# Patient Record
Sex: FEMALE | Race: WHITE
[De-identification: names, ages, dates, MRNs, and addresses within clinical notes are randomized per-mention and may not be internally consistent; named-entity substitution may affect disease eponyms.]

---

## 2017-02-10 ENCOUNTER — OFFICE VISIT (OUTPATIENT)
Dept: FAMILY MEDICINE CLINIC | Facility: CLINIC | Age: 27
End: 2017-02-10

## 2017-02-10 VITALS — DIASTOLIC BLOOD PRESSURE: 84 MMHG | TEMPERATURE: 98 F | HEART RATE: 89 BPM | SYSTOLIC BLOOD PRESSURE: 110 MMHG

## 2017-02-10 DIAGNOSIS — S67.22XA: ICD-10-CM

## 2017-02-10 PROBLEM — S67.10XA: Status: ACTIVE | Noted: 2017-02-10

## 2017-02-10 PROCEDURE — 11730 AVULSION NAIL PLATE SIMPLE 1: CPT | Performed by: PHYSICIAN ASSISTANT

## 2017-02-10 PROCEDURE — 99213 OFFICE O/P EST LOW 20 MIN: CPT | Performed by: PHYSICIAN ASSISTANT

## 2017-02-10 PROCEDURE — 90714 TD VACC NO PRESV 7 YRS+ IM: CPT | Performed by: PHYSICIAN ASSISTANT

## 2017-02-10 PROCEDURE — 90471 IMMUNIZATION ADMIN: CPT | Performed by: PHYSICIAN ASSISTANT

## 2017-02-10 PROCEDURE — 96372 THER/PROPH/DIAG INJ SC/IM: CPT | Performed by: PHYSICIAN ASSISTANT

## 2017-02-10 RX ORDER — CEFTRIAXONE 500 MG/1
1000 INJECTION, POWDER, FOR SOLUTION INTRAMUSCULAR; INTRAVENOUS ONCE
Status: COMPLETED | OUTPATIENT
Start: 2017-02-10 | End: 2017-02-10

## 2017-02-10 RX ORDER — CLINDAMYCIN HYDROCHLORIDE 300 MG/1
300 CAPSULE ORAL 3 TIMES DAILY
COMMUNITY

## 2017-02-10 RX ADMIN — CEFTRIAXONE 1000 MG: 500 INJECTION, POWDER, FOR SOLUTION INTRAMUSCULAR; INTRAVENOUS at 17:57

## 2022-01-06 ENCOUNTER — OFFICE VISIT (OUTPATIENT)
Dept: PRIMARY CARE CLINIC | Age: 32
End: 2022-01-06

## 2022-01-06 ENCOUNTER — HOSPITAL ENCOUNTER (OUTPATIENT)
Age: 32
Setting detail: SPECIMEN
Discharge: HOME | End: 2022-01-06

## 2022-01-06 VITALS
WEIGHT: 200 LBS | TEMPERATURE: 97.3 F | HEART RATE: 81 BPM | OXYGEN SATURATION: 98 % | HEIGHT: 69 IN | SYSTOLIC BLOOD PRESSURE: 117 MMHG | BODY MASS INDEX: 29.62 KG/M2 | DIASTOLIC BLOOD PRESSURE: 68 MMHG

## 2022-01-06 DIAGNOSIS — Z20.822 CONTACT WITH AND (SUSPECTED) EXPOSURE TO COVID-19: Primary | ICD-10-CM

## 2022-01-06 DIAGNOSIS — Z20.822 CONTACT WITH AND (SUSPECTED) EXPOSURE TO COVID-19: ICD-10-CM

## 2022-01-06 PROCEDURE — 99203 OFFICE O/P NEW LOW 30 MIN: CPT

## 2022-01-06 RX ORDER — VENLAFAXINE HYDROCHLORIDE 75 MG/1
CAPSULE, EXTENDED RELEASE ORAL
COMMUNITY
Start: 2021-12-02

## 2022-01-06 ASSESSMENT — ENCOUNTER SYMPTOMS
CHEST TIGHTNESS: 0
SINUS COMPLAINT: 1
GASTROINTESTINAL NEGATIVE: 1
ABDOMINAL PAIN: 0
EYE DISCHARGE: 0
WHEEZING: 0
RHINORRHEA: 0
SINUS PAIN: 0
COUGH: 1
SORE THROAT: 1
NAUSEA: 0
EYES NEGATIVE: 1
EYE ITCHING: 0
HOARSE VOICE: 0
SWOLLEN GLANDS: 0
EYE PAIN: 0
SINUS PRESSURE: 0
DIARRHEA: 0
VOMITING: 0
SHORTNESS OF BREATH: 0

## 2022-01-06 ASSESSMENT — PATIENT HEALTH QUESTIONNAIRE - PHQ9
SUM OF ALL RESPONSES TO PHQ QUESTIONS 1-9: 1
2. FEELING DOWN, DEPRESSED OR HOPELESS: 1
1. LITTLE INTEREST OR PLEASURE IN DOING THINGS: 0
SUM OF ALL RESPONSES TO PHQ9 QUESTIONS 1 & 2: 1
SUM OF ALL RESPONSES TO PHQ QUESTIONS 1-9: 1

## 2022-01-06 NOTE — PROGRESS NOTES
[facility] [address] [place] [facility] [address]    [facility] WALK IN CARE  [id number] [address]  Dept: [phone]    Luz Mckenzie is a 28 y.o. female New patient, who presents to the walk-in clinic today with conditions/complaints as noted below:    Chief Complaint   Patient presents with    Concern For COVID-19     cough, congestion, fever; known exposure to COVID          HPI:     Sinus Problem  This is a new problem. Episode onset: Few days ago. The problem has been gradually worsening since onset. There has been no fever. The pain is mild. Associated symptoms include congestion, coughing and a sore throat. Pertinent negatives include no chills, diaphoresis, ear pain, headaches, hoarse voice, neck pain, shortness of breath, sinus pressure, sneezing or swollen glands. Past treatments include nothing. Declines influenza swabbing. History reviewed. No pertinent past medical history. Current Outpatient Medications   Medication Sig Dispense Refill    venlafaxine (EFFEXOR XR) 75 MG extended release capsule       clindamycin (CLEOCIN) 300 MG capsule Take 300 mg by mouth 3 times daily (Patient not taking: Reported on 1/6/2022)      HYDROcodone-acetaminophen (NORCO) 5-325 MG per tablet Take 1-2 tablets by mouth every 6 hours as needed for Pain . (Patient not taking: Reported on 1/6/2022) 30 tablet 0     No current facility-administered medications for this visit. No Known Allergies    Review of Systems:     Review of Systems   Constitutional: Positive for fever. Negative for chills, diaphoresis and fatigue. HENT: Positive for congestion and sore throat. Negative for ear pain, hoarse voice, postnasal drip, rhinorrhea, sinus pressure, sinus pain and sneezing. Eyes: Negative. Negative for pain, discharge and itching. Respiratory: Positive for cough. Negative for chest tightness, shortness of breath and wheezing. Cardiovascular: Negative. Negative for chest pain, palpitations and leg swelling. Gastrointestinal: Negative. Negative for abdominal pain, diarrhea, nausea and vomiting. Musculoskeletal: Negative for neck pain. Skin: Negative. Negative for rash. Neurological: Negative. Negative for dizziness, weakness, numbness and headaches. Physical Exam:      /68   Pulse 81   Temp 97.3 °F (36.3 °C) (Infrared)   Ht 5' 9\" (1.753 m)   Wt 200 lb (90.7 kg)   SpO2 98%   BMI 29.53 kg/m²     Physical Exam  Vitals reviewed. Constitutional:       Appearance: Normal appearance. She is normal weight. HENT:      Head: Normocephalic and atraumatic. Right Ear: Tympanic membrane, ear canal and external ear normal.      Left Ear: Tympanic membrane, ear canal and external ear normal.      Nose: Congestion and rhinorrhea present. Mouth/Throat:      Lips: Pink. Mouth: Mucous membranes are moist.      Pharynx: Oropharynx is clear. Uvula midline. Posterior oropharyngeal erythema present. No pharyngeal swelling, oropharyngeal exudate or uvula swelling. Tonsils: No tonsillar exudate or tonsillar abscesses. Eyes:      Extraocular Movements: Extraocular movements intact. Conjunctiva/sclera: Conjunctivae normal.      Pupils: Pupils are equal, round, and reactive to light. Cardiovascular:      Rate and Rhythm: Normal rate and regular rhythm. Pulses: Normal pulses. Heart sounds: Normal heart sounds. Pulmonary:      Effort: Pulmonary effort is normal.      Breath sounds: Normal air entry. Examination of the right-upper field reveals decreased breath sounds. Examination of the left-upper field reveals decreased breath sounds. Examination of the right-middle field reveals decreased breath sounds. Examination of the left-middle field reveals decreased breath sounds.  Decreased breath sounds present. Abdominal:      General: Abdomen is flat. Bowel sounds are normal.      Palpations: Abdomen is soft. Musculoskeletal:      Cervical back: Normal range of motion and neck supple. Skin:     General: Skin is warm and dry. Capillary Refill: Capillary refill takes less than 2 seconds. Neurological:      General: No focal deficit present. Mental Status: She is alert and oriented to person, place, and time. Mental status is at baseline. Psychiatric:         Mood and Affect: Mood normal.         Behavior: Behavior normal.         Plan:          1. Contact with and (suspected) exposure to covid-19  -     COVID-19; Future   Continue over-the-counter medications as needed for symptoms. Use honey to the back of throat, salt water gargle as needed for sore throat, cough. Go to the ER for shortness of breath, chest pain. Patient patient verbalized understanding. Follow Up Instructions:      Return if symptoms worsen or fail to improve, for SOB, chest pain go to ER. No orders of the defined types were placed in this encounter. Will send out COVID19 testing. Possible treatment alterations based on the results. Patient instructed to self-quarantine until testing results are back. Patient instructed not to return to work until results are back. Tylenol as needed for fever/pain. Encouraged adequate hydration and rest.  The patient indicates understanding of these issues and agrees with the plan. Educational materials provided on AVS.  Follow up if symptoms do not improve/worsen. Discussed symptoms that will warrant urgent ED evaluation/treatment. Patient and/or parent given educational materials - see patient instructions. Discussed use, benefit, and side effects of prescribed medications. All patient questions answered. Patient and/or parent voiced understanding.       Electronically signed by SHAKA Castanon 1/6/2022 at 11:49 AM

## 2022-01-06 NOTE — PATIENT INSTRUCTIONS
Patient Education        Learning About Coronavirus [phone]  What is coronavirus (COVID-19)? COVID-19 is a disease caused by a type of coronavirus. This illness was first found in December 2019. It has since spread worldwide. Coronaviruses are a large group of viruses. They cause the common cold. They also cause more serious illnesses like [place] respiratory syndrome (MERS) and severe acute respiratory syndrome (SARS). COVID-19 is caused by a novel coronavirus. That means it's a new type that has not been seen in people before. What are the symptoms? COVID-19 symptoms may include:  · Fever. · Cough. · Trouble breathing. · Chills or repeated shaking with chills. · Muscle and body aches. · Headache. · Sore throat. · New loss of taste or smell. · Vomiting. · Diarrhea. In severe cases, COVID-19 can cause pneumonia and make it hard to breathe without help from a machine. It can cause death. How is it diagnosed? COVID-19 is diagnosed with a viral test. This may also be called a PCR test or antigen test. It looks for evidence of the virus in your breathing passages or lungs (respiratory system). The test is most often done on a sample from the nose, throat, or lungs. It's sometimes done on a sample of saliva. One way a sample is collected is by putting a long swab into the back of your nose. How is it treated? Mild cases of COVID-19 can be treated at home. Serious cases need treatment in the hospital. Treatment may include medicines to reduce symptoms, plus breathing support such as oxygen therapy or a ventilator. Some people may be placed on their belly to help their oxygen levels. Treatments that may help people who have COVID-19 include:  Antiviral medicines. These medicines treat viral infections. Remdesivir is an example. Immune-based therapy. These medicines help the immune system fight COVID-19. Examples include monoclonal antibodies. Blood thinners.   These medicines help prevent blood clots. People with severe illness are at risk for blood clots. How can you protect yourself and others? · Get vaccinated. · Avoid sick people. · Cover your mouth with a tissue when you cough or sneeze. · Wash your hands often, especially after you cough or sneeze. Use soap and water, and scrub for at least 20 seconds. If soap and water aren't available, use an alcohol-based hand . · Avoid touching your mouth, nose, and eyes. Be sure to follow all instructions from the [facility] and your local health authorities. Here are some examples of specific precautions you may need to take. · If you are not fully vaccinated:  ? Wear a mask if you have to go to public areas. ? Avoid crowds and try to stay at least 6 feet away from other people. · If you have been exposed to the virus and are not fully vaccinated:  ? Stay home. Don't go to school, work, or public areas. And don't use public transportation, ride-shares, or taxis unless you have no choice. ? Wear a mask if you have to go to public areas, like the pharmacy. · Even if you're fully vaccinated, there's still a small chance you can get and spread COVID-19. If you live in an area where COVID-19 is spreading quickly, wear a mask if you have to go to indoor public areas. You might also want to wear a mask in crowded outdoor areas if you:  ? Have certain health conditions. ? Live with someone who has a compromised immune system. ? Live with someone who is not fully vaccinated. · If you have been exposed and you are fully vaccinated:  ? Talk to your doctor. You may need a COVID-19 test.  ? Wear a mask in public indoor spaces for 14 days or until you test negative for COVID-19. If you're sick:  · Leave your home only if you need to get medical care. But call the doctor's office first so they know you're coming. And wear a mask. · Wear a mask whenever you're around other people. · Limit contact with pets and people in your home.  If possible, stay in a separate bedroom and use a separate bathroom. · Clean and disinfect your home every day. Use household  and disinfectant wipes or sprays. Take special care to clean things that you touch with your hands. How can you self-isolate when you have COVID-19? If you have COVID-19, there are things you can do to help avoid spreading the virus to others. · Limit contact with people in your home. If possible, stay in a separate bedroom and use a separate bathroom. · Wear a mask when you are around other people. · If you have to leave home, avoid crowds and try to stay at least 6 feet away from other people. · Avoid contact with pets and other animals. · Cover your mouth and nose with a tissue when you cough or sneeze. Then throw it in the trash right away. · Wash your hands often, especially after you cough or sneeze. Use soap and water, and scrub for at least 20 seconds. If soap and water aren't available, use an alcohol-based hand . · Don't share personal household items. These include bedding, towels, cups and glasses, and eating utensils. · [address] in the warmest water allowed for the fabric type, and dry it completely. It's okay to wash other people's laundry with yours. · Clean and disinfect your home. Use household  and disinfectant wipes or sprays. When should you call for help? Call 911 anytime you think you may need emergency care. For example, call if you have life-threatening symptoms, such as:    · You have severe trouble breathing. (You can't talk at all.)     · You have constant chest pain or pressure.     · You are severely dizzy or lightheaded.     · You are confused or can't think clearly.     · You have pale, gray, or blue-colored skin or lips.     · You pass out (lose consciousness) or are very hard to wake up. Call your doctor now or seek immediate medical care if:    · You have moderate trouble breathing.  (You can't speak a full sentence.)     · You are coughing up blood (more than about 1 teaspoon).     · You have signs of low blood pressure. These include feeling lightheaded; being too weak to stand; and having cold, pale, clammy skin. Watch closely for changes in your health, and be sure to contact your doctor if:    · Your symptoms get worse.     · You are not getting better as expected.     · You have new or worse symptoms of anxiety, depression, nightmares, or flashbacks. Call before you go to the doctor's office. Follow their instructions. And wear a mask. Current as of: July 1, 2021               Content Version: 13.1  © 2006-2021 [employer]. Care instructions adapted under license by [facility] ([facility]). If you have questions about a medical condition or this instruction, always ask your healthcare professional. [address] any warranty or liability for your use of this information.

## 2022-01-07 LAB
SARS-COV-2: ABNORMAL
SARS-COV-2: DETECTED
SOURCE: ABNORMAL

## 2023-05-01 ENCOUNTER — OFFICE VISIT (OUTPATIENT)
Dept: PRIMARY CARE CLINIC | Age: 33
End: 2023-05-01
Payer: COMMERCIAL

## 2023-05-01 VITALS
TEMPERATURE: 98.6 F | DIASTOLIC BLOOD PRESSURE: 64 MMHG | HEART RATE: 97 BPM | BODY MASS INDEX: 31.1 KG/M2 | OXYGEN SATURATION: 97 % | SYSTOLIC BLOOD PRESSURE: 92 MMHG | WEIGHT: 210 LBS | HEIGHT: 69 IN

## 2023-05-01 DIAGNOSIS — J01.10 ACUTE NON-RECURRENT FRONTAL SINUSITIS: Primary | ICD-10-CM

## 2023-05-01 PROCEDURE — 99213 OFFICE O/P EST LOW 20 MIN: CPT

## 2023-05-01 RX ORDER — AMOXICILLIN AND CLAVULANATE POTASSIUM 875; 125 MG/1; MG/1
1 TABLET, FILM COATED ORAL 2 TIMES DAILY
Qty: 20 TABLET | Refills: 0 | Status: SHIPPED | OUTPATIENT
Start: 2023-05-01 | End: 2023-05-11

## 2023-05-01 RX ORDER — PREDNISONE 20 MG/1
40 TABLET ORAL DAILY
Qty: 10 TABLET | Refills: 0 | Status: SHIPPED | OUTPATIENT
Start: 2023-05-01 | End: 2023-05-06

## 2023-05-01 ASSESSMENT — ENCOUNTER SYMPTOMS
BLOOD IN STOOL: 0
WHEEZING: 0
SINUS PRESSURE: 1
CHEST TIGHTNESS: 0
RECTAL PAIN: 0
TROUBLE SWALLOWING: 0
EYE DISCHARGE: 0
ANAL BLEEDING: 0
FACIAL SWELLING: 0
EYE PAIN: 0
CHOKING: 0
BACK PAIN: 0
ABDOMINAL PAIN: 0
NAUSEA: 0
VOMITING: 0
ABDOMINAL DISTENTION: 0
SINUS PAIN: 0
HOARSE VOICE: 0
VOICE CHANGE: 0
EYE REDNESS: 0
PHOTOPHOBIA: 0
APNEA: 0
RHINORRHEA: 1
SORE THROAT: 0
COUGH: 1
SHORTNESS OF BREATH: 0
SWOLLEN GLANDS: 0
EYE ITCHING: 0
GASTROINTESTINAL NEGATIVE: 1
CONSTIPATION: 0
COLOR CHANGE: 0
DIARRHEA: 0
STRIDOR: 0
EYES NEGATIVE: 1

## 2023-05-01 ASSESSMENT — PATIENT HEALTH QUESTIONNAIRE - PHQ9
1. LITTLE INTEREST OR PLEASURE IN DOING THINGS: 0
SUM OF ALL RESPONSES TO PHQ QUESTIONS 1-9: 0
2. FEELING DOWN, DEPRESSED OR HOPELESS: 0
SUM OF ALL RESPONSES TO PHQ QUESTIONS 1-9: 0
SUM OF ALL RESPONSES TO PHQ QUESTIONS 1-9: 0
SUM OF ALL RESPONSES TO PHQ9 QUESTIONS 1 & 2: 0
SUM OF ALL RESPONSES TO PHQ QUESTIONS 1-9: 0

## 2023-05-01 NOTE — PROGRESS NOTES
Nose:      Right Sinus: Frontal sinus tenderness present. No maxillary sinus tenderness. Left Sinus: Frontal sinus tenderness present. No maxillary sinus tenderness. Mouth/Throat:      Lips: Pink. Mouth: Mucous membranes are moist.      Pharynx: Uvula midline. Oropharyngeal exudate (yellow, thick) present. No pharyngeal swelling, posterior oropharyngeal erythema or uvula swelling. Tonsils: No tonsillar exudate or tonsillar abscesses. Eyes:      Extraocular Movements: Extraocular movements intact. Conjunctiva/sclera: Conjunctivae normal.      Pupils: Pupils are equal, round, and reactive to light. Cardiovascular:      Rate and Rhythm: Normal rate and regular rhythm. Pulses: Normal pulses. Heart sounds: Normal heart sounds. Pulmonary:      Effort: Pulmonary effort is normal.      Breath sounds: Normal breath sounds and air entry. Abdominal:      General: Abdomen is flat. Bowel sounds are normal.      Palpations: Abdomen is soft. Musculoskeletal:         General: Normal range of motion. Cervical back: Normal range of motion and neck supple. Lymphadenopathy:      Cervical: Cervical adenopathy present. Skin:     General: Skin is warm and dry. Capillary Refill: Capillary refill takes less than 2 seconds. Neurological:      General: No focal deficit present. Mental Status: She is alert and oriented to person, place, and time. Mental status is at baseline. Psychiatric:         Mood and Affect: Mood normal.         Behavior: Behavior normal.       Plan:          1. Acute non-recurrent frontal sinusitis  -     amoxicillin-clavulanate (AUGMENTIN) 875-125 MG per tablet; Take 1 tablet by mouth 2 times daily for 10 days, Disp-20 tablet, R-0Normal  -     predniSONE (DELTASONE) 20 MG tablet; Take 2 tablets by mouth daily for 5 days, Disp-10 tablet, R-0Normal     Continue over-the-counter medications as needed for symptoms such as Tylenol/Motrin, otc sinus products.

## 2024-10-07 ENCOUNTER — HOSPITAL ENCOUNTER (OUTPATIENT)
Age: 34
Setting detail: SPECIMEN
Discharge: HOME | End: 2024-10-07

## 2024-10-07 ENCOUNTER — OFFICE VISIT (OUTPATIENT)
Dept: OBGYN CLINIC | Age: 34
End: 2024-10-07
Payer: COMMERCIAL

## 2024-10-07 VITALS
HEIGHT: 69 IN | HEART RATE: 90 BPM | SYSTOLIC BLOOD PRESSURE: 119 MMHG | DIASTOLIC BLOOD PRESSURE: 76 MMHG | BODY MASS INDEX: 35.99 KG/M2 | WEIGHT: 243 LBS

## 2024-10-07 DIAGNOSIS — N84.0 ENDOMETRIAL POLYP: ICD-10-CM

## 2024-10-07 DIAGNOSIS — N80.9 ENDOMETRIOSIS DETERMINED BY LAPAROSCOPY: ICD-10-CM

## 2024-10-07 DIAGNOSIS — Z3A.09 9 WEEKS GESTATION OF PREGNANCY: ICD-10-CM

## 2024-10-07 DIAGNOSIS — Z32.00 ENCOUNTER FOR CONFIRMATION OF PREGNANCY TEST RESULT WITH PHYSICAL EXAMINATION: Primary | ICD-10-CM

## 2024-10-07 DIAGNOSIS — Z34.91 PRENATAL CARE IN FIRST TRIMESTER: ICD-10-CM

## 2024-10-07 DIAGNOSIS — N84.1 CERVICAL POLYP: ICD-10-CM

## 2024-10-07 DIAGNOSIS — Z83.3 FAMILY HISTORY OF DIABETES MELLITUS: ICD-10-CM

## 2024-10-07 DIAGNOSIS — Z23 NEED FOR INFLUENZA VACCINATION: ICD-10-CM

## 2024-10-07 DIAGNOSIS — Z32.00 ENCOUNTER FOR CONFIRMATION OF PREGNANCY TEST RESULT WITH PHYSICAL EXAMINATION: ICD-10-CM

## 2024-10-07 LAB
AMPHET UR QL SCN: NEGATIVE
BARBITURATES UR QL SCN: NEGATIVE
BENZODIAZ UR QL: NEGATIVE
CANDIDA SPECIES: NEGATIVE
CANNABINOIDS UR QL SCN: NEGATIVE
COCAINE UR QL SCN: NEGATIVE
FENTANYL UR QL: NEGATIVE
GARDNERELLA VAGINALIS: NEGATIVE
METHADONE UR QL: NEGATIVE
OPIATES UR QL SCN: NEGATIVE
OXYCODONE UR QL SCN: NEGATIVE
PCP UR QL SCN: NEGATIVE
SOURCE: NORMAL
TEST INFORMATION: NORMAL
TRICHOMONAS: NEGATIVE

## 2024-10-07 PROCEDURE — 99204 OFFICE O/P NEW MOD 45 MIN: CPT | Performed by: STUDENT IN AN ORGANIZED HEALTH CARE EDUCATION/TRAINING PROGRAM

## 2024-10-07 PROCEDURE — 90661 CCIIV3 VAC ABX FR 0.5 ML IM: CPT | Performed by: STUDENT IN AN ORGANIZED HEALTH CARE EDUCATION/TRAINING PROGRAM

## 2024-10-07 PROCEDURE — 90471 IMMUNIZATION ADMIN: CPT | Performed by: STUDENT IN AN ORGANIZED HEALTH CARE EDUCATION/TRAINING PROGRAM

## 2024-10-07 RX ORDER — LANOLIN ALCOHOL/MO/W.PET/CERES
50 CREAM (GRAM) TOPICAL DAILY
Qty: 30 TABLET | Refills: 3 | Status: SHIPPED | OUTPATIENT
Start: 2024-10-07

## 2024-10-07 RX ORDER — ACETAMINOPHEN 500 MG
1000 TABLET ORAL EVERY 8 HOURS PRN
COMMUNITY
Start: 2024-04-03

## 2024-10-07 RX ORDER — ONDANSETRON 4 MG/1
4 TABLET, FILM COATED ORAL 3 TIMES DAILY PRN
Qty: 15 TABLET | Refills: 1 | Status: SHIPPED | OUTPATIENT
Start: 2024-10-07

## 2024-10-07 NOTE — PROGRESS NOTES
After obtaining verbal consent, and per orders of Dr. Geraldine Berger, injection of Flu 0.5mL given in Right deltoid IM by Nancy Bear MA. Patient instructed to remain in clinic for 20 minutes afterwards, and to report any adverse reaction to me immediately.    NDC 24635-636-85   LOT [id number]  EXP 6.17.25    Last seen: Visit date not found  Next appt: 10/21/2024

## 2024-10-07 NOTE — PROGRESS NOTES
[facility]  Initial Prenatal Visit    CC: Initial Prenatal Visit    HPI:   Keiko Araujo is a 34 y.o. female  at 9w0d  She is being seen today for her first obstetrical visit. This is a planned pregnancy and they were trying for 2 years prior to achieving pregnancy.  Patient's last menstrual period was 2024 (exact date). Pregnancy history fully reviewed. Her obstetrical history is low risk. She delivered her last baby at [facility] 3 years ago.    The patient was seen and evaluated. The patient complains of severe nausea in the AM. She denies cramping, vaginal bleeding and leakage of fluid. She currently denies any signs or symptoms of pre-eclampsia which include headache, vision changes, RUQ pain.    Relationship with FOB:   Mother's ethnicity:   Father's ethnicity:   Family History:    - Neural tube defects: No   - Congenital birth defects (congenital heart defects, polydactyly, cleft lip/palate): No   - Intellectual disability: No   - Genetic disorders/chromosomal abnormalities: No   - Diabetes mellitus in first degree relatives: No    Genetic screening was discussed and patient is interested.    OB History:  OB History    Para Term  AB Living   2 1 1 0 0 1   SAB IAB Ectopic Molar Multiple Live Births   0 0 0 0 0 1      # Outcome Date GA Lbr Ji/2nd Weight Sex Type Anes PTL Lv   2 Current            1 Term 21 40w1d   F Vag-Spont   ISABEL      Name: Joyce       Past Medical History:  Past Medical History:   Diagnosis Date    Abnormal Pap smear of cervix     Anemia     Endometriosis     Postpartum depression        Past Surgical History:  Past Surgical History:   Procedure Laterality Date    DILATION AND CURETTAGE      HYSTEROSCOPY      LAPAROSCOPY  10/2020        Medications:  Current Outpatient Medications on File Prior to Visit   Medication Sig Dispense Refill    acetaminophen (TYLENOL) 500 MG tablet Take 2 tablets by mouth every 8 hours as needed

## 2024-10-08 LAB
C TRACH DNA SPEC QL PROBE+SIG AMP: NEGATIVE
MICROORGANISM SPEC CULT: NORMAL
N GONORRHOEA DNA SPEC QL PROBE+SIG AMP: NEGATIVE
SERVICE CMNT-IMP: NORMAL
SPECIMEN DESCRIPTION: NORMAL
SPECIMEN DESCRIPTION: NORMAL

## 2024-10-09 LAB
HPV I/H RISK 4 DNA CVX QL NAA+PROBE: NOT DETECTED
HPV SAMPLE: NORMAL
HPV, INTERPRETATION: NORMAL
HPV16 DNA CVX QL NAA+PROBE: NOT DETECTED
HPV18 DNA CVX QL NAA+PROBE: NOT DETECTED
SPECIMEN DESCRIPTION: NORMAL

## 2024-10-16 DIAGNOSIS — Z32.00 ENCOUNTER FOR CONFIRMATION OF PREGNANCY TEST RESULT WITH PHYSICAL EXAMINATION: ICD-10-CM

## 2024-10-17 LAB — CYTOLOGY REPORT: NORMAL

## 2024-10-21 ENCOUNTER — INITIAL PRENATAL (OUTPATIENT)
Dept: OBGYN CLINIC | Age: 34
End: 2024-10-21

## 2024-10-21 VITALS
DIASTOLIC BLOOD PRESSURE: 78 MMHG | SYSTOLIC BLOOD PRESSURE: 108 MMHG | HEIGHT: 69 IN | BODY MASS INDEX: 36.14 KG/M2 | WEIGHT: 244 LBS

## 2024-10-21 DIAGNOSIS — Z86.718 HISTORY OF MATERNAL DEEP VEIN THROMBOSIS (DVT): ICD-10-CM

## 2024-10-21 DIAGNOSIS — Z82.0 FAMILY HISTORY OF EPILEPSY: ICD-10-CM

## 2024-10-21 DIAGNOSIS — Z83.3 FAMILY HISTORY OF DIABETES MELLITUS: Primary | ICD-10-CM

## 2024-10-21 DIAGNOSIS — Z82.49 FAMILY HISTORY OF DVT: ICD-10-CM

## 2024-10-21 DIAGNOSIS — O99.210 SEVERE OBESITY DUE TO EXCESS CALORIES AFFECTING PREGNANCY, ANTEPARTUM: ICD-10-CM

## 2024-10-21 DIAGNOSIS — D64.9 LOW HEMOGLOBIN: ICD-10-CM

## 2024-10-21 DIAGNOSIS — Z82.69 FAMILY HISTORY OF SYSTEMIC LUPUS ERYTHEMATOSUS: ICD-10-CM

## 2024-10-21 DIAGNOSIS — Z87.898 HISTORY OF EDEMA: ICD-10-CM

## 2024-10-21 DIAGNOSIS — Z87.59 HISTORY OF MATERNAL DEEP VEIN THROMBOSIS (DVT): ICD-10-CM

## 2024-10-21 DIAGNOSIS — Z3A.10 10 WEEKS GESTATION OF PREGNANCY: ICD-10-CM

## 2024-10-21 DIAGNOSIS — N80.9 ENDOMETRIOSIS DETERMINED BY LAPAROSCOPY: ICD-10-CM

## 2024-10-21 DIAGNOSIS — E66.01 SEVERE OBESITY DUE TO EXCESS CALORIES AFFECTING PREGNANCY, ANTEPARTUM: ICD-10-CM

## 2024-10-21 PROCEDURE — G8427 DOCREV CUR MEDS BY ELIG CLIN: HCPCS | Performed by: STUDENT IN AN ORGANIZED HEALTH CARE EDUCATION/TRAINING PROGRAM

## 2024-10-21 PROCEDURE — 0500F INITIAL PRENATAL CARE VISIT: CPT | Performed by: STUDENT IN AN ORGANIZED HEALTH CARE EDUCATION/TRAINING PROGRAM

## 2024-10-21 PROCEDURE — G8419 CALC BMI OUT NRM PARAM NOF/U: HCPCS | Performed by: STUDENT IN AN ORGANIZED HEALTH CARE EDUCATION/TRAINING PROGRAM

## 2024-10-21 RX ORDER — FERROUS SULFATE 325(65) MG
325 TABLET ORAL 2 TIMES DAILY
Qty: 180 TABLET | Refills: 1 | Status: SHIPPED | OUTPATIENT
Start: 2024-10-21 | End: 2025-04-19

## 2024-10-21 NOTE — PROGRESS NOTES
[facility]  ANATOMIC PATHOLOGY  [address] [place] [address].  [place], [place] [address] [phone]  Fax: [phone]     Human papillomavirus (HPV) DNA probe thin prep high risk    Collection Time: 10/07/24 12:00 AM   Result Value Ref Range    Specimen Description CERVICAL MATERIAL     HPV Sample .THIN PREP     HPV, Genotype 16 Not Detected Not Detected    HPV, Genotype 18 Not Detected Not Detected    HPV, High Risk Other Not Detected Not Detected    HPV, Interpretation         C.trachomatis N.gonorrhoeae DNA    Collection Time: 10/07/24  7:31 PM    Specimen: Cervix   Result Value Ref Range    Specimen Description .CERVIX     C. trachomatis DNA NEGATIVE NEGATIVE    N. gonorrhoeae DNA NEGATIVE NEGATIVE   Vaginitis DNA Probe    Collection Time: 10/07/24  7:32 PM    Specimen: Vaginal   Result Value Ref Range    Source .VAGINAL SWAB     Trichomonas NEGATIVE NEGATIVE    GARDNERELLA VAGINALIS NEGATIVE NEGATIVE    Candida species NEGATIVE NEGATIVE   Culture, Urine    Collection Time: 10/07/24  7:32 PM    Specimen: Urine, clean catch   Result Value Ref Range    Specimen Description .CLEAN CATCH URINE     Special Requests Site: Urine     Culture NO SIGNIFICANT GROWTH    Urine Drug Screen    Collection Time: 10/07/24  7:32 PM   Result Value Ref Range    Amphetamine Screen, Ur NEGATIVE NEGATIVE    Barbiturate Screen, Ur NEGATIVE NEGATIVE    Benzodiazepine Screen, Urine NEGATIVE NEGATIVE    Cocaine Metabolite, Urine NEGATIVE NEGATIVE    Methadone Screen, Urine NEGATIVE NEGATIVE    Opiates, Urine NEGATIVE NEGATIVE    Phencyclidine, Urine NEGATIVE NEGATIVE    Cannabinoid Scrn, Ur NEGATIVE NEGATIVE    Oxycodone Screen, Ur NEGATIVE NEGATIVE    Fentanyl, Ur NEGATIVE NEGATIVE    Test Information       Assay provides rapid clinical screening only.  Presumptive positive results for legal purposes should be confirmed by another method.  To request confirmation, please call the lab within 7 days of sample submission.       Past Medical

## 2024-11-03 SDOH — ECONOMIC STABILITY: TRANSPORTATION INSECURITY
IN THE PAST 12 MONTHS, HAS LACK OF TRANSPORTATION KEPT YOU FROM MEETINGS, WORK, OR FROM GETTING THINGS NEEDED FOR DAILY LIVING?: NO

## 2024-11-03 SDOH — ECONOMIC STABILITY: FOOD INSECURITY: WITHIN THE PAST 12 MONTHS, YOU WORRIED THAT YOUR FOOD WOULD RUN OUT BEFORE YOU GOT MONEY TO BUY MORE.: NEVER TRUE

## 2024-11-03 SDOH — ECONOMIC STABILITY: INCOME INSECURITY: HOW HARD IS IT FOR YOU TO PAY FOR THE VERY BASICS LIKE FOOD, HOUSING, MEDICAL CARE, AND HEATING?: NOT VERY HARD

## 2024-11-03 SDOH — ECONOMIC STABILITY: FOOD INSECURITY: WITHIN THE PAST 12 MONTHS, THE FOOD YOU BOUGHT JUST DIDN'T LAST AND YOU DIDN'T HAVE MONEY TO GET MORE.: NEVER TRUE

## 2024-11-06 ENCOUNTER — ROUTINE PRENATAL (OUTPATIENT)
Dept: OBGYN CLINIC | Age: 34
End: 2024-11-06

## 2024-11-06 VITALS
WEIGHT: 242.4 LBS | DIASTOLIC BLOOD PRESSURE: 61 MMHG | BODY MASS INDEX: 35.8 KG/M2 | SYSTOLIC BLOOD PRESSURE: 111 MMHG | HEART RATE: 89 BPM

## 2024-11-06 DIAGNOSIS — Z34.91 PRENATAL CARE IN FIRST TRIMESTER: ICD-10-CM

## 2024-11-06 DIAGNOSIS — Z3A.13 13 WEEKS GESTATION OF PREGNANCY: Primary | ICD-10-CM

## 2024-11-06 NOTE — PROGRESS NOTES
Prenatal Visit    Keiko Araujo is a 34 y.o. female  at 13w2d IUP    Subjective:  The patient was seen and evaluated. She has no complaints today. She is not yet feeling. she denies contractions, vaginal bleeding and leakage of fluid. Signs and symptoms of  labor as well as labor were reviewed. Dates were reviewed with the patient. Estimated Date of Delivery: 25.     The problem list reflects the active issues addressed during today's visit    VITALS:  BP: 111/61  Weight - Scale: 110 kg (242 lb 6.4 oz)  Pulse: 89  Patient Position: Sitting  Fetal HR: 169     PHYSICAL:   General appearance: no apparent distress, alert and cooperative  HEENT: head atraumatic, normocephalic, trachea midline, moist mucous membranes   Neurologic: alert, oriented, normal speech   Lungs: no increased work of breathing,   Abdomen: soft, gravid, non-tender on palpation    Musculoskeletal: no gross abnormalities, range of motion appropriate for age   Psychiatric: mood appropriate, normal affect      Assessment & Plan:  Keiko Araujo is a 34 y.o. female  at 13w2d IUP   - VSS    - Initial prenatal labs reviewed in media section of chart and normal   - Continue taking prenatal vitamins QD    - An 18-22 week anatomy ultrasound has been scheduled   - NIPT declined   - Influenza vaccination: R/B/A discussed and patient received 10/7/2024   - Aspirin indication: BMI 35   - Indications for  section: Not applicable   Return in about 4 weeks (around 2024) for Routine OB.    No orders of the defined types were placed in this encounter.    Patient Active Problem List    Diagnosis Date Noted    Pre-Pregnancy BMI 35 10/21/2024     Early 1 hr GTT ordered  NST/BPPs @ 37 weeks      Hx LE swelling in previous pregnancy 10/21/2024     Patient had dopplers to rule out DVT.  Report was NEGATIVE for DVT.    Notes are in CareEverywhere.      Family history of diabetes mellitus 10/07/2024     Patient's father, was an

## 2024-11-08 ENCOUNTER — HOSPITAL ENCOUNTER (OUTPATIENT)
Age: 34
Setting detail: SPECIMEN
Discharge: HOME | End: 2024-11-08

## 2024-11-08 LAB
GLUCOSE 1H P 50 G GLC PO SERPL-MCNC: 115 MG/DL (ref 70–135)
GLUCOSE ADMINISTRATION: NORMAL
HIV 1+2 AB+HIV1 P24 AG SERPL QL IA: NONREACTIVE

## 2024-11-18 ENCOUNTER — HOSPITAL ENCOUNTER (EMERGENCY)
Age: 34
Discharge: HOME | End: 2024-11-18
Attending: EMERGENCY MEDICINE
Payer: COMMERCIAL

## 2024-11-18 VITALS
OXYGEN SATURATION: 97 % | SYSTOLIC BLOOD PRESSURE: 117 MMHG | TEMPERATURE: 97.6 F | RESPIRATION RATE: 17 BRPM | HEART RATE: 91 BPM | DIASTOLIC BLOOD PRESSURE: 72 MMHG

## 2024-11-18 DIAGNOSIS — O46.90 VAGINAL BLEEDING IN PREGNANCY: Primary | ICD-10-CM

## 2024-11-18 LAB
ABO + RH BLD: NORMAL
ALBUMIN SERPL-MCNC: 3.8 G/DL (ref 3.5–5.2)
ALBUMIN/GLOB SERPL: 1.2 {RATIO} (ref 1–2.5)
ALP SERPL-CCNC: 67 U/L (ref 35–104)
ALT SERPL-CCNC: <5 U/L (ref 10–35)
ANION GAP SERPL CALCULATED.3IONS-SCNC: 11 MMOL/L (ref 9–16)
AST SERPL-CCNC: 16 U/L (ref 10–35)
BASOPHILS # BLD: <0.03 K/UL (ref 0–0.2)
BASOPHILS NFR BLD: 0 % (ref 0–2)
BILIRUB SERPL-MCNC: 0.2 MG/DL (ref 0–1.2)
BILIRUB UR QL STRIP: NEGATIVE
BUN SERPL-MCNC: 7 MG/DL (ref 6–20)
CALCIUM SERPL-MCNC: 9.1 MG/DL (ref 8.6–10.4)
CANDIDA SPECIES: NEGATIVE
CASTS #/AREA URNS LPF: ABNORMAL /LPF (ref 0–2)
CASTS #/AREA URNS LPF: ABNORMAL /LPF (ref 0–2)
CHLORIDE SERPL-SCNC: 103 MMOL/L (ref 98–107)
CLARITY UR: CLEAR
CO2 SERPL-SCNC: 21 MMOL/L (ref 20–31)
COLOR UR: YELLOW
CREAT SERPL-MCNC: 0.5 MG/DL (ref 0.6–0.9)
EOSINOPHIL # BLD: 0.07 K/UL (ref 0–0.44)
EOSINOPHILS RELATIVE PERCENT: 1 % (ref 1–4)
EPI CELLS #/AREA URNS HPF: ABNORMAL /HPF (ref 0–5)
ERYTHROCYTE [DISTWIDTH] IN BLOOD BY AUTOMATED COUNT: 13.3 % (ref 11.8–14.4)
GARDNERELLA VAGINALIS: NEGATIVE
GFR, ESTIMATED: >90 ML/MIN/1.73M2
GLUCOSE SERPL-MCNC: 110 MG/DL (ref 74–99)
GLUCOSE UR STRIP-MCNC: NEGATIVE MG/DL
HCT VFR BLD AUTO: 33.7 % (ref 36.3–47.1)
HGB BLD-MCNC: 10.9 G/DL (ref 11.9–15.1)
HGB UR QL STRIP.AUTO: NEGATIVE
IMM GRANULOCYTES # BLD AUTO: 0.03 K/UL (ref 0–0.3)
IMM GRANULOCYTES NFR BLD: 0 %
KETONES UR STRIP-MCNC: NEGATIVE MG/DL
LEUKOCYTE ESTERASE UR QL STRIP: ABNORMAL
LYMPHOCYTES NFR BLD: 1.79 K/UL (ref 1.1–3.7)
LYMPHOCYTES RELATIVE PERCENT: 18 % (ref 24–43)
MCH RBC QN AUTO: 28.3 PG (ref 25.2–33.5)
MCHC RBC AUTO-ENTMCNC: 32.3 G/DL (ref 28.4–34.8)
MCV RBC AUTO: 87.5 FL (ref 82.6–102.9)
MONOCYTES NFR BLD: 0.59 K/UL (ref 0.1–1.2)
MONOCYTES NFR BLD: 6 % (ref 3–12)
NEUTROPHILS NFR BLD: 75 % (ref 36–65)
NEUTS SEG NFR BLD: 7.32 K/UL (ref 1.5–8.1)
NITRITE UR QL STRIP: NEGATIVE
NRBC BLD-RTO: 0 PER 100 WBC
PH UR STRIP: 6.5 [PH] (ref 5–8)
PLATELET # BLD AUTO: 170 K/UL (ref 138–453)
PMV BLD AUTO: 9.8 FL (ref 8.1–13.5)
POTASSIUM SERPL-SCNC: 3.9 MMOL/L (ref 3.7–5.3)
PROT SERPL-MCNC: 7 G/DL (ref 6.6–8.7)
PROT UR STRIP-MCNC: NEGATIVE MG/DL
RBC # BLD AUTO: 3.85 M/UL (ref 3.95–5.11)
RBC #/AREA URNS HPF: ABNORMAL /HPF (ref 0–2)
SODIUM SERPL-SCNC: 135 MMOL/L (ref 136–145)
SOURCE: NORMAL
SP GR UR STRIP: 1 (ref 1–1.03)
TRICHOMONAS: NEGATIVE
UROBILINOGEN UR STRIP-ACNC: NORMAL EU/DL (ref 0–1)
WBC #/AREA URNS HPF: ABNORMAL /HPF (ref 0–5)
WBC OTHER # BLD: 9.8 K/UL (ref 3.5–11.3)

## 2024-11-18 PROCEDURE — 87491 CHLMYD TRACH DNA AMP PROBE: CPT

## 2024-11-18 PROCEDURE — 87086 URINE CULTURE/COLONY COUNT: CPT

## 2024-11-18 PROCEDURE — 86900 BLOOD TYPING SEROLOGIC ABO: CPT

## 2024-11-18 PROCEDURE — 86901 BLOOD TYPING SEROLOGIC RH(D): CPT

## 2024-11-18 PROCEDURE — 81001 URINALYSIS AUTO W/SCOPE: CPT

## 2024-11-18 PROCEDURE — 80053 COMPREHEN METABOLIC PANEL: CPT

## 2024-11-18 PROCEDURE — 87480 CANDIDA DNA DIR PROBE: CPT

## 2024-11-18 PROCEDURE — 87591 N.GONORRHOEAE DNA AMP PROB: CPT

## 2024-11-18 PROCEDURE — 87660 TRICHOMONAS VAGIN DIR PROBE: CPT

## 2024-11-18 PROCEDURE — 85025 COMPLETE CBC W/AUTO DIFF WBC: CPT

## 2024-11-18 PROCEDURE — 99283 EMERGENCY DEPT VISIT LOW MDM: CPT

## 2024-11-18 PROCEDURE — 87510 GARDNER VAG DNA DIR PROBE: CPT

## 2024-11-18 ASSESSMENT — PAIN - FUNCTIONAL ASSESSMENT: PAIN_FUNCTIONAL_ASSESSMENT: 0-10

## 2024-11-18 ASSESSMENT — PAIN SCALES - GENERAL: PAINLEVEL_OUTOF10: 6

## 2024-11-18 ASSESSMENT — LIFESTYLE VARIABLES
HOW OFTEN DO YOU HAVE A DRINK CONTAINING ALCOHOL: PATIENT DECLINED
HOW MANY STANDARD DRINKS CONTAINING ALCOHOL DO YOU HAVE ON A TYPICAL DAY: PATIENT DECLINED

## 2024-11-19 ASSESSMENT — ENCOUNTER SYMPTOMS
SHORTNESS OF BREATH: 0
ABDOMINAL PAIN: 0

## 2024-11-19 NOTE — DISCHARGE INSTRUCTIONS
You were seen here due to concern for vaginal bleeding and abdominal cramping.  Pelvic exam was performed, cervical os was closed and there was no vaginal bleeding present on exam.  Bedside ultrasound was also performed and showed normal fetal heart rate of 152.    You need to call and schedule follow-up appointment with her primary care doctor and OB for soon as possible.    Return to the emergency department immediately if you experience worsening symptoms, develop any other symptoms, or if you have any other concerns.

## 2024-11-19 NOTE — ED PROVIDER NOTES
[facility] ED     Emergency Department     Faculty Attestation        I performed a history and physical examination of the patient and discussed management with the resident. I reviewed the resident’s note and agree with the documented findings and plan of care. Any areas of disagreement are noted on the chart. I was personally present for the key portions of any procedures. I have documented in the chart those procedures where I was not present during the key portions. I have reviewed the emergency nurses triage note. I agree with the chief complaint, past medical history, past surgical history, allergies, medications, social and family history as documented unless otherwise noted below.    For mid-level providers such as nurse practitioners as well as physicians assistants:    I have personally seen and evaluated the patient.    I find the patient's history and physical exam are consistent with NP/PA documentation.  I agree with the care provided, treatment rendered, disposition, & follow-up plan.     Additional findings are as noted.    Vital Signs: /72   Pulse 91   Temp 97.6 °F (36.4 °C) (Oral)   Resp 17   LMP 08/05/2024 (Exact Date)   SpO2 97%   PCP:  No primary care provider on file.    Pertinent Comments:     Patient with lower pelvic pain vaginal bleeding.  She is approximately 1 15 weeks pregnant.  She had ultrasound earlier in this pregnancy which confirmed IUP.  She has no active pain now she is awake alert oriented with a GCS of 15 mild suprapubic abdominal chest pain rebound or guarding obtain labs quantitative hCG, bedside ultrasound, reassessment      Critical Care  None          Kerwin Maldonado MD    Attending Emergency Medicine Physician            Too Maldonado MD  11/18/24 2028    
Katelyn Urbina DO   acetaminophen (TYLENOL) 500 MG tablet Take 2 tablets by mouth every 8 hours as needed  Patient not taking: Reported on 10/21/2024 4/3/24   Provider, MD Cristina   doxyLAMINE succinate (UNISOM SLEEPTABS) 25 MG tablet Take 1 tablet by mouth nightly as needed for Sleep 10/7/24   Geraldine Berger DO   vitamin B-6 (PYRIDOXINE) 50 MG tablet Take 1 tablet by mouth daily 10/7/24   Geraldine Berger DO   ondansetron (ZOFRAN) 4 MG tablet Take 1 tablet by mouth 3 times daily as needed for Nausea or Vomiting  Patient not taking: Reported on 10/21/2024 10/7/24   Geraldine Berger DO   sertraline (ZOLOFT) 50 MG tablet Take 1 tablet by mouth daily  Patient not taking: Reported on 10/7/2024 10/12/22   Provider, MD Cristina       REVIEW OF SYSTEMS       Review of Systems   Constitutional:  Negative for fever.   Respiratory:  Negative for shortness of breath.    Cardiovascular:  Negative for chest pain.   Gastrointestinal:  Negative for abdominal pain.       PHYSICAL EXAM      INITIAL VITALS:   /72   Pulse 91   Temp 97.6 °F (36.4 °C) (Oral)   Resp 17   LMP 08/05/2024 (Exact Date)   SpO2 97%     Physical Exam  Cardiovascular:      Rate and Rhythm: Normal rate and regular rhythm.      Pulses: Normal pulses.   Pulmonary:      Effort: Pulmonary effort is normal.      Breath sounds: Normal breath sounds.   Abdominal:      General: There is no distension.      Palpations: Abdomen is soft.      Tenderness: There is no abdominal tenderness. There is no guarding or rebound.   Skin:     General: Skin is warm.   Neurological:      Mental Status: She is alert and oriented to person, place, and time.   Psychiatric:         Mood and Affect: Mood normal.       DDX/DIAGNOSTIC RESULTS / EMERGENCY DEPARTMENT COURSE / MDM     Medical Decision Making  34-year-old female, presents to the emergency department due to abdominal cramping and vaginal bleeding that started today.  Patient states that she noticed the

## 2024-11-19 NOTE — ED NOTES
Pt present to the ed with c/o vaginal bleeding and abdominal cramping. Pt stated symptoms started at 10 am this morning.  Pt stated she has a history of endometriosis. Pt stated she is having discharge and spotting. Pt stated that when she wipes she has some blood. Pt stated she does not have to use a pad. Pt stated her  pain is a 6/10. Pt alert and oriented x4, talking in complete sentences, respirations even and unlabored. Pt acting age appropriate. Will continue to plan of care.

## 2024-11-19 NOTE — ED NOTES
The following labs were labeled with appropriate pt sticker and tubed to lab:     [] Blue     [x] Lavender   [] on ice  [x] Green/yellow  [] Green/black [] on ice  [] Hernandez  [] on ice  [x] Yellow  [] Red  [] Pink  [] Type/ Screen  [] ABG  [] VBG    [] COVID-19 swab    [] Rapid  [] PCR  [] Flu swab  [] Peds Viral Panel     [x] Urine Sample  [] Fecal Sample  [x] Pelvic Cultures  [] Blood Cultures  [] X 2  [] STREP Cultures  [] Wound Cultures

## 2024-11-21 ENCOUNTER — TELEPHONE (OUTPATIENT)
Dept: OBGYN CLINIC | Age: 34
End: 2024-11-21

## 2024-11-21 NOTE — TELEPHONE ENCOUNTER
OB 15.3 wks    Bleeding Start 11/18/24    ER on 11/18/24 11/21/24 felt like start of period (bright pinkish red, with bead size clots)     Cramping minor  HA-no  No intercourse       Informed pt  would reach out later today.

## 2024-11-22 ENCOUNTER — HOSPITAL ENCOUNTER (OUTPATIENT)
Age: 34
Setting detail: OBSERVATION
Discharge: HOME | End: 2024-11-22
Attending: EMERGENCY MEDICINE | Admitting: STUDENT IN AN ORGANIZED HEALTH CARE EDUCATION/TRAINING PROGRAM
Payer: COMMERCIAL

## 2024-11-22 VITALS
RESPIRATION RATE: 16 BRPM | WEIGHT: 240 LBS | SYSTOLIC BLOOD PRESSURE: 107 MMHG | HEIGHT: 69 IN | BODY MASS INDEX: 35.55 KG/M2 | DIASTOLIC BLOOD PRESSURE: 51 MMHG | HEART RATE: 86 BPM | OXYGEN SATURATION: 100 % | TEMPERATURE: 98.2 F

## 2024-11-22 DIAGNOSIS — D64.9 LOW HEMOGLOBIN: ICD-10-CM

## 2024-11-22 DIAGNOSIS — Z3A.10 10 WEEKS GESTATION OF PREGNANCY: ICD-10-CM

## 2024-11-22 DIAGNOSIS — O03.9 MISCARRIAGE: Primary | ICD-10-CM

## 2024-11-22 PROBLEM — N93.9 VAGINA BLEEDING: Status: ACTIVE | Noted: 2024-11-22

## 2024-11-22 LAB
ABO + RH BLD: NORMAL
ARM BAND NUMBER: NORMAL
BASOPHILS # BLD: 0.03 K/UL (ref 0–0.2)
BASOPHILS NFR BLD: 0 % (ref 0–2)
BLOOD BANK SAMPLE EXPIRATION: NORMAL
BLOOD GROUP ANTIBODIES SERPL: NEGATIVE
EOSINOPHIL # BLD: 0.05 K/UL (ref 0–0.44)
EOSINOPHILS RELATIVE PERCENT: 0 % (ref 1–4)
ERYTHROCYTE [DISTWIDTH] IN BLOOD BY AUTOMATED COUNT: 13.2 % (ref 11.8–14.4)
HCT VFR BLD AUTO: 36.1 % (ref 36.3–47.1)
HGB BLD-MCNC: 11.4 G/DL (ref 11.9–15.1)
IMM GRANULOCYTES # BLD AUTO: 0.06 K/UL (ref 0–0.3)
IMM GRANULOCYTES NFR BLD: 1 %
LYMPHOCYTES NFR BLD: 1.36 K/UL (ref 1.1–3.7)
LYMPHOCYTES RELATIVE PERCENT: 10 % (ref 24–43)
MCH RBC QN AUTO: 28.6 PG (ref 25.2–33.5)
MCHC RBC AUTO-ENTMCNC: 31.6 G/DL (ref 28.4–34.8)
MCV RBC AUTO: 90.5 FL (ref 82.6–102.9)
MONOCYTES NFR BLD: 0.85 K/UL (ref 0.1–1.2)
MONOCYTES NFR BLD: 7 % (ref 3–12)
NEUTROPHILS NFR BLD: 82 % (ref 36–65)
NEUTS SEG NFR BLD: 10.67 K/UL (ref 1.5–8.1)
NRBC BLD-RTO: 0 PER 100 WBC
PLATELET # BLD AUTO: 176 K/UL (ref 138–453)
PMV BLD AUTO: 10.3 FL (ref 8.1–13.5)
RBC # BLD AUTO: 3.99 M/UL (ref 3.95–5.11)
SEND OUT REPORT: NORMAL
TEST NAME: NORMAL
WBC OTHER # BLD: 13 K/UL (ref 3.5–11.3)

## 2024-11-22 PROCEDURE — G0378 HOSPITAL OBSERVATION PER HR: HCPCS

## 2024-11-22 PROCEDURE — 86900 BLOOD TYPING SEROLOGIC ABO: CPT

## 2024-11-22 PROCEDURE — 96374 THER/PROPH/DIAG INJ IV PUSH: CPT

## 2024-11-22 PROCEDURE — 86901 BLOOD TYPING SEROLOGIC RH(D): CPT

## 2024-11-22 PROCEDURE — 6360000002 HC RX W HCPCS

## 2024-11-22 PROCEDURE — 85025 COMPLETE CBC W/AUTO DIFF WBC: CPT

## 2024-11-22 PROCEDURE — 88300 SURGICAL PATH GROSS: CPT

## 2024-11-22 PROCEDURE — 88305 TISSUE EXAM BY PATHOLOGIST: CPT

## 2024-11-22 PROCEDURE — 86850 RBC ANTIBODY SCREEN: CPT

## 2024-11-22 PROCEDURE — 36415 COLL VENOUS BLD VENIPUNCTURE: CPT

## 2024-11-22 PROCEDURE — 99285 EMERGENCY DEPT VISIT HI MDM: CPT

## 2024-11-22 RX ORDER — SODIUM CHLORIDE 0.9 % (FLUSH) 0.9 %
5-40 SYRINGE (ML) INJECTION EVERY 12 HOURS SCHEDULED
Status: DISCONTINUED | OUTPATIENT
Start: 2024-11-22 | End: 2024-11-22 | Stop reason: HOSPADM

## 2024-11-22 RX ORDER — FERROUS SULFATE 325(65) MG
325 TABLET ORAL
Qty: 180 TABLET | Refills: 1 | Status: SHIPPED | OUTPATIENT
Start: 2024-11-22 | End: 2025-05-21

## 2024-11-22 RX ORDER — ONDANSETRON 4 MG/1
4 TABLET, ORALLY DISINTEGRATING ORAL EVERY 6 HOURS PRN
Status: DISCONTINUED | OUTPATIENT
Start: 2024-11-22 | End: 2024-11-22 | Stop reason: HOSPADM

## 2024-11-22 RX ORDER — SODIUM CHLORIDE 0.9 % (FLUSH) 0.9 %
5-40 SYRINGE (ML) INJECTION PRN
Status: DISCONTINUED | OUTPATIENT
Start: 2024-11-22 | End: 2024-11-22 | Stop reason: HOSPADM

## 2024-11-22 RX ORDER — ACETAMINOPHEN 500 MG
1000 TABLET ORAL EVERY 6 HOURS PRN
Status: DISCONTINUED | OUTPATIENT
Start: 2024-11-22 | End: 2024-11-22 | Stop reason: HOSPADM

## 2024-11-22 RX ORDER — SODIUM CHLORIDE 9 MG/ML
INJECTION, SOLUTION INTRAVENOUS PRN
Status: DISCONTINUED | OUTPATIENT
Start: 2024-11-22 | End: 2024-11-22 | Stop reason: HOSPADM

## 2024-11-22 RX ORDER — SENNA AND DOCUSATE SODIUM 50; 8.6 MG/1; MG/1
1 TABLET, FILM COATED ORAL NIGHTLY
Status: DISCONTINUED | OUTPATIENT
Start: 2024-11-22 | End: 2024-11-22 | Stop reason: HOSPADM

## 2024-11-22 RX ORDER — KETOROLAC TROMETHAMINE 30 MG/ML
30 INJECTION, SOLUTION INTRAMUSCULAR; INTRAVENOUS ONCE
Status: COMPLETED | OUTPATIENT
Start: 2024-11-22 | End: 2024-11-22

## 2024-11-22 RX ORDER — ONDANSETRON 2 MG/ML
4 INJECTION INTRAMUSCULAR; INTRAVENOUS EVERY 6 HOURS PRN
Status: DISCONTINUED | OUTPATIENT
Start: 2024-11-22 | End: 2024-11-22 | Stop reason: HOSPADM

## 2024-11-22 RX ORDER — IBUPROFEN 400 MG/1
800 TABLET, FILM COATED ORAL EVERY 6 HOURS PRN
Status: DISCONTINUED | OUTPATIENT
Start: 2024-11-22 | End: 2024-11-22 | Stop reason: HOSPADM

## 2024-11-22 RX ADMIN — KETOROLAC TROMETHAMINE 30 MG: 30 INJECTION, SOLUTION INTRAMUSCULAR; INTRAVENOUS at 10:47

## 2024-11-22 ASSESSMENT — PAIN DESCRIPTION - LOCATION
LOCATION: ABDOMEN
LOCATION: ABDOMEN

## 2024-11-22 ASSESSMENT — ENCOUNTER SYMPTOMS: ABDOMINAL PAIN: 1

## 2024-11-22 ASSESSMENT — PAIN - FUNCTIONAL ASSESSMENT: PAIN_FUNCTIONAL_ASSESSMENT: 0-10

## 2024-11-22 ASSESSMENT — PAIN DESCRIPTION - DESCRIPTORS: DESCRIPTORS: CRAMPING

## 2024-11-22 ASSESSMENT — PAIN SCALES - GENERAL
PAINLEVEL_OUTOF10: 3
PAINLEVEL_OUTOF10: 6

## 2024-11-22 ASSESSMENT — LIFESTYLE VARIABLES: HOW OFTEN DO YOU HAVE A DRINK CONTAINING ALCOHOL: NEVER

## 2024-11-22 ASSESSMENT — PAIN DESCRIPTION - PAIN TYPE: TYPE: ACUTE PAIN

## 2024-11-22 NOTE — H&P
OB/GYN [facility]    Patient Name: Keiko Araujo     Patient : 1990  Room/Bed: AMANDA/AMANDA  Admission Date/Time: 2024  9:19 AM  Primary Care Physician: No primary care provider on file.    CC:   Chief Complaint   Patient presents with    Vaginal Bleeding                HPI: Keiko Araujo is a 34 y.o. female  presents the the ER, c/o vaginal bleeding. Patient's last menstrual period was 2024 (exact date).     Upon writer entering the room, the patient, her , ER RN and Dr. Pérez at bedside. Patient has delivered fetus and placenta en caul. The patient's bleeding is stable on abdominal exam, one small clot evacuated from vaginal canal. No evidence of vaginal lacerations.    The patient had been having bleeding and cramping for the last several days, but today had multiple gushes of vaginal bleeding, she was prompted to come to the ER by her primary OB provider.     REVIEW OF SYSTEMS:   Constitutional: negative fever, negative chills, negative weight changes   HEENT: negative visual disturbances, negative headaches, negative dizziness, negative hearing loss  Breast: Negative breast abnormalities, negative breast lumps, negative nipple discharge  Respiratory: negative dyspnea, negative cough, negative SOB  Cardiovascular: negative chest pain,  negative palpitations, negative arrhythmia, negative syncope   Gastrointestinal: positive abdominal cramps, negative RUQ pain, negative N/V, negative diarrhea, negative constipation, negative bowel changes, negative heartburn   Genitourinary: negative dysuria, negative hematuria, negative urinary incontinence, negative vaginal discharge, positive vaginal bleeding   Dermatological: negative rash, negative pruritis, negative mole or other skin changes  Hematologic: negative bruising  Immunologic/Lymphatic: negative recent illness, negative recent sick contact  Musculoskeletal: negative back pain, negative myalgias,

## 2024-11-22 NOTE — ED PROVIDER NOTES
[facility] ED  Emergency Department Encounter  Emergency Medicine Resident     Pt Name:Keiko Araujo  MRN: [id number]  Birthdate 1990  Date of evaluation: 11/22/24  PCP:  No primary care provider on file.  Note Started: 11:35 AM EST      CHIEF COMPLAINT       Chief Complaint   Patient presents with    Vaginal Bleeding       HISTORY OF PRESENT ILLNESS  (Location/Symptom, Timing/Onset, Context/Setting, Quality, Duration, Modifying Factors, Severity.)      Keiko Araujo is a 34 y.o. female G2, P1 who was brought down to the ER from labor and delivery after she felt a \"gush of fluid\" upstairs.  Patient states that on Monday she started having some light vaginal bleeding that continued throughout the week.  Today she started developing abdominal cramping which is why she called her OB/GYN who is Dr. Berger who recommended she come to L&D.  Patient is approximately 15 weeks and 4 days. She denies any lightheadedness or dizziness.  She is having some abdominal cramping at this time.     PAST MEDICAL / SURGICAL / SOCIAL / FAMILY HISTORY      has a past medical history of Abnormal Pap smear of cervix, Anemia, Endometriosis, and Postpartum depression.     has a past surgical history that includes Dilation & curettage (04/2024); laparoscopy (10/2020); and hysteroscopy.    Social History     Socioeconomic History    Marital status:      Spouse name: Not on file    Number of children: Not on file    Years of education: Not on file    Highest education level: Not on file   Occupational History    Not on file   Tobacco Use    Smoking status: Never    Smokeless tobacco: Never   Vaping Use    Vaping status: Never Used   Substance and Sexual Activity    Alcohol use: Yes     Comment: rarely    Drug use: Never    Sexual activity: Yes     Partners: Male   Other Topics Concern    Not on file   Social History Narrative    Not on file     Social Determinants of Health     Financial Resource Strain: Not  11/22/24 1142   Fri Nov 22, 2024   1025 Hemoglobin Quant(!): 11.4  Stable [KR]   1025 Blood type O+. [KR]   1025 OB resident performed bedside ultrasound and did not appear to be any retained products of conception or retained placenta.  They plan to admit patient for observation. [KR]   1043 Reevaluated patient.  She did get up to pee and there was a small clot.  No further bleeding after that.  She denies any lightheadedness or dizziness.  Patient to receive Toradol here soon for her abdominal pain. [KR]      ED Course User Index  [KR] Lazaro Pérez MD     CONSULTS:  IP CONSULT TO OB GYN    CRITICAL CARE:  There was significant risk of life threatening deterioration of patient's condition requiring my direct management. Critical care time 30 minutes, excluding any documented procedures.    FINAL IMPRESSION      1. Miscarriage          DISPOSITION / PLAN     DISPOSITION Admitted 11/22/2024 10:19:17 AM           PATIENT REFERRED TO:  No follow-up provider specified.    DISCHARGE MEDICATIONS:  New Prescriptions    No medications on file       Lazaro Pérez MD  Emergency Medicine Resident    (Please note that portions of this note were completed with a voice recognition program.  Efforts were made to edit the dictations but occasionally words are mis-transcribed.)

## 2024-11-22 NOTE — ED PROVIDER NOTES
Pertanant Comments:    This patient was accidentally clicked on.    I did not see or participate in the care of this patient.      Jewel Guzmán MD [facility]  Attending Emergency Medicine Physician       Jewel Guzmán MD  11/22/24 0920

## 2024-11-22 NOTE — PROGRESS NOTES
[facility]  PROGRESS NOTE    Shift date: 11.22.2024  Shift day: Friday   Shift # 1    Room # 0326/0326-02        Name: Keiko Araujo MRN: [id number]    Age: 34 y.o.     Sex: female   Language: English   Restoration: Other   Vagina bleeding     Referral: Nurse    Date: 11/22/2024            Total Time Calculated: (P) 30 min              Spiritual Assessment began in [facility]        Referral/Consult From: (P) Nurse   Encounter Overview/Reason: (P) Grief, Loss, and Adjustments  Service Provided For: (P) Patient, Significant other    Admit Date & Time: 11/22/2024  9:19 AM    Emergency Contacts Listed:  Extended Emergency Contact Information  Primary Emergency Contact: Tone Araujo  Address: [address] [place] [id number] [facility]  Home Phone: [phone]  Relation: Spouse  Secondary Emergency Contact: Doreen Mcdonough  Address: [address] [place] [id number] [facility]  Home Phone: [phone]  Relation: Brother/Sister      Assessment:  Keiko Araujo is a 34 y.o. female in the hospital because Vagina bleeding.  was contacted to assist with burial information.  Mother has requested [facility].      Upon entering the room writer observes Patient appearing calm but sad.    Perceived Need:  Receive care and concern, Receive comfort, and Recieve hospitality    Intervention:  Writer introduced self and title as  and provided Provided space for feelings, thoughts, and concerns, Actively listened, and Listened empathetically.    Outcome:  Built relationships of care and support and Improved communication  Patient provided information on [facility].      Plan:  Chaplains will follow up as needed \      Electronically signed by NEREIDA Dunn on 11/22/2024 at 4:19 PM  [facility]  [phone]     11/22/24 1615   Encounter Summary

## 2024-11-22 NOTE — PROGRESS NOTES
[facility]  Miscarriage/Fetal Demise Note                                     MISCARRIAGE       Shift date: 2024  Shift day: Friday      Shift # 2                  Room # 0326/0326-02   Name: Keiko Araujo            Age: 34 y.o.  Gender: female          Presybeterian: UNK  Place of Islam: UNK  Admit Date & Time: 2024  9:19 AM     Referral: Multi-Disciplinary Team    Actual date of delivery: 2024  TOD: N/A       SITUATION AT DEATH:  Patient had (miscarriage/fetal demise).    BABY'S GIVEN NAME:  No name specified     SPIRITUAL ASSESSMENT - INTERVENTION - OUTCOME:  Upon entering the room the patient and mother of ( baby) was sitting up in bed. Father of ( baby) was seated in chair adjacent to patient's bed. Chaplain Viera engaged in conversation concerning next steps and release of body forms. Writer provided patient and significant other a grief packet that also contained the Spiritual Care Department's contact information. The  (baby) was given no name at the time of writing and was approximately 15 weeks. The remains shall be released to [facility] the patient/parents desired site to lay (baby) remains to rest. Writer provided patient/parents picture and address to burial site. Writer and Chaplain Viera provided a ministry of presence and engaged in active listening. Writer provided words of encouragement and exited room #326.     Cemetery:  Name: [facility]   City: [place]   Phone Number: [phone]    PT's CONTACT INFORMATION:  Name: Keiko Araujo   Address: [address] [place] [address]  Phone Number: [phone]    Electronically signed by Andree Mcdowell,Rosielain Resident, on 2024 at 4:25 PM.  [facility]  [phone]

## 2024-11-22 NOTE — PROGRESS NOTES
At approximately 1415, pt arrived to unit, fetus remained in ED, Dr Bender arrived to unit around 1430, informed writer that pt needed labs drawn for genetic testing, and that fetal remains were in ED and she was going to retrieve them prior to returning to OR, writer informed Dr Bender that phlebotomy would be called to obtain the blood work, Dr Bender also advised than fetal demise form needed to be completed, writer contacted L&D to find out what form was needed and charge RN contacted  and L&D charge to assist with completion of necessary forms. Electronically signed by CHANDRA FELIX RN on 11/22/2024 at 6:07 PM

## 2024-11-22 NOTE — PROGRESS NOTES
CLINICAL PHARMACY NOTE: MEDS TO BEDS    Total # of Prescriptions Filled: 1   The following medications were delivered to the patient:  Iron tablets    Additional Documentation:

## 2024-11-22 NOTE — PROGRESS NOTES
OB/GYN Physician Interval Note    Patient seen at bedside with her . Reports her pain is well controlled and she is not having any heavy bleeding. States she has not needed to change her michael pad. She has been able to ambulate and use the restroom. Denies any vaginal bleeding or dysuria. She is tolerated a normal diet without N/V.     Lots of support and encouragement given for fetal loss. Advise she re start the zoloft she has at home. Plan to continue taking prenatal vitamins daily if she is considering pregnancy in the future.     Signature obtained to collect genetics. Fetal specimens (skin/cord insertion site, liver, kneecap) were collected and sent for genetic review in [facility] box.     Will follow up in 2 weeks for a postpartum appointment. Message sent to office. Reviewed post partum restrictions and pain management at home. She has motrin and tylenol already and does not need Rx.     Patient is hemodynamically stable and ready for discharge.    Vitals:    11/22/24 0940 11/22/24 0943 11/22/24 1345 11/22/24 1445   BP: 116/68  (!) 101/49 (!) 107/51   Pulse: 79 87 75 86   Resp: 20 17 13 16   Temp:    98.2 °F (36.8 °C)   TempSrc:    Oral   SpO2: 99% 98% 100%    Weight:       Height:           Elsy Snyder DO   OB/GYN Physician   [facility]  11/22/2024, 4:38 PM

## 2024-11-22 NOTE — ED NOTES
Patient presents to ED with significant other for evaluation of vaginal bleeding. Patient is  15W4D. Patient has been having vaginal bleeding since Monday. Patient was seen on Monday at which time she was diagnosed with subchorionic hemorrhage, told the fetus looked okay, and discharged home. Patient reports the bleeding has been light spotting all week, but this morning she developed abdominal cramping. Patient called her OB office and was told to come to L&D. Patient went to L&D and was sent down to ED for evaluation.  Patient is alert and oriented x4, answering questions appropriately. Respirations even and unlabored. Patient changed into gown, placed on full cardiac monitor, BP cuff, and pulse ox. IV established.

## 2024-11-22 NOTE — CONSULTS
OB/GYN Consult  [facility]    Patient Name: Keiko Araujo     Patient : 1990  Room/Bed:   Admission Date/Time: 2024  9:19 AM  Primary Care Physician: No primary care provider on file.    Consulting Provider: Dr. Pérez  Reason for Consult:  labor    CC:   Chief Complaint   Patient presents with    Vaginal Bleeding                HPI: Keiko Araujo is a 34 y.o. female  presents the the ER, c/o vaginal bleeding. Patient's last menstrual period was 2024 (exact date).     Upon writer entering the room, the patient, her , ER RN and Dr. Pérez at bedside. Patient has delivered fetus and placenta en caul. The patient's bleeding is stable on abdominal exam, one small clot evacuated from vaginal canal. No evidence of vaginal lacerations.    The patient had been having bleeding and cramping for the last several days, but today had multiple gushes of vaginal bleeding, she was prompted to come to the ER by her primary OB provider.     REVIEW OF SYSTEMS:   Constitutional: negative fever, negative chills, negative weight changes   HEENT: negative visual disturbances, negative headaches, negative dizziness, negative hearing loss  Breast: Negative breast abnormalities, negative breast lumps, negative nipple discharge  Respiratory: negative dyspnea, negative cough, negative SOB  Cardiovascular: negative chest pain,  negative palpitations, negative arrhythmia, negative syncope   Gastrointestinal: positive abdominal cramps, negative RUQ pain, negative N/V, negative diarrhea, negative constipation, negative bowel changes, negative heartburn   Genitourinary: negative dysuria, negative hematuria, negative urinary incontinence, negative vaginal discharge, positive vaginal bleeding   Dermatological: negative rash, negative pruritis, negative mole or other skin changes  Hematologic: negative bruising  Immunologic/Lymphatic: negative recent illness, negative recent  sick contact  Musculoskeletal: negative back pain, negative myalgias, negative arthralgias  Neurological:  negative dizziness, negative migraines, negative seizures, negative weakness  Behavior/Psych: negative depression, negative anxiety, negative SI, negative HI    _______________________________________________________________________    OBSTETRIC HISTORY:   OB History    Para Term  AB Living   2 1 1 0 0 1   SAB IAB Ectopic Molar Multiple Live Births   0 0 0 0 0 1      # Outcome Date GA Lbr Ji/2nd Weight Sex Type Anes PTL Lv   2 Current            1 Term 21 40w1d   F Vag-Spont EPI N ISABEL      Name: Joyce       PAST MEDICAL HISTORY:   has a past medical history of Abnormal Pap smear of cervix, Anemia, Endometriosis, and Postpartum depression.    PAST SURGICAL HISTORY:   has a past surgical history that includes Dilation & curettage (2024); laparoscopy (10/2020); and hysteroscopy.    ALLERGIES:  Allergies as of 2024    (No Known Allergies)       MEDICATIONS:  Current Facility-Administered Medications   Medication Dose Route Frequency Provider Last Rate Last Admin    ketorolac (TORADOL) injection 30 mg  30 mg IntraVENous Once Luisa Johns,          Current Outpatient Medications   Medication Sig Dispense Refill    Prenatal MV-Min-Fe Fum-FA-DHA (PRENATAL 1 PO) Take 1 tablet by mouth daily gummy      ferrous sulfate (IRON 325) 325 (65 Fe) MG tablet Take 1 tablet by mouth 2 times daily 180 tablet 1    acetaminophen (TYLENOL) 500 MG tablet Take 2 tablets by mouth every 8 hours as needed (Patient not taking: Reported on 10/21/2024)      doxyLAMINE succinate (UNISOM SLEEPTABS) 25 MG tablet Take 1 tablet by mouth nightly as needed for Sleep 60 tablet 1    vitamin B-6 (PYRIDOXINE) 50 MG tablet Take 1 tablet by mouth daily 30 tablet 3    ondansetron (ZOFRAN) 4 MG tablet Take 1 tablet by mouth 3 times daily as needed for Nausea or Vomiting (Patient not taking: Reported on 10/21/2024) 15

## 2024-11-22 NOTE — ED PROVIDER NOTES
[facility]     Emergency Department     Faculty Attestation    I performed a history and physical examination of the patient and discussed management with the resident. I reviewed the resident’s note and agree with the documented findings and plan of care. Any areas of disagreement are noted on the chart. I was personally present for the key portions of any procedures. I have documented in the chart those procedures where I was not present during the key portions. I have reviewed the emergency nurses triage note. I agree with the chief complaint, past medical history, past surgical history, allergies, medications, social and family history as documented unless otherwise noted below. Documentation of the HPI, Physical Exam and Medical Decision Making performed by medical students or scribes is based on my personal performance of the HPI, PE and MDM. For Physician Assistant/ Nurse Practitioner cases/documentation I have personally evaluated this patient and have completed at least one if not all key elements of the E/M (history, physical exam, and MDM). Additional findings are as noted.    Vital signs:   Vitals:    11/22/24 0943   BP:    Pulse: 87   Resp: 17   Temp:    SpO2: 98%      34-year-old female, 15 weeks pregnant, presents with vaginal bleeding.  This is her second pregnancy.  Patient states that when she stood up, she felt a gush of bleeding.  Patient does not have any known placental abnormalities.  While in the emergency department, the patient again felt another gush of blood, with passage of the fetus.  The placenta soon followed.  No active hemorrhage.  OB evaluated the patient at the bedside, and we await further recommendations.  Her blood type is O+.            Dee Covarrubias M.D,  Attending Emergency  Physician           Dee Covarrubias MD  11/22/24 0954

## 2024-11-22 NOTE — ED NOTES
OBGYN resident at bedside to evaluate the patient. Patient's bleeding evaluated. Still very small amount of bleeding present. OBGYN resident to evaluate patient via bedside ultrasound

## 2024-11-25 LAB
SEND OUT REPORT: NORMAL
TEST NAME: NORMAL

## 2024-11-29 LAB — SURGICAL PATHOLOGY REPORT: NORMAL

## 2024-12-05 ENCOUNTER — POSTPARTUM VISIT (OUTPATIENT)
Dept: OBGYN CLINIC | Age: 34
End: 2024-12-05

## 2024-12-05 VITALS
DIASTOLIC BLOOD PRESSURE: 61 MMHG | BODY MASS INDEX: 34.97 KG/M2 | WEIGHT: 236.8 LBS | HEART RATE: 89 BPM | SYSTOLIC BLOOD PRESSURE: 106 MMHG

## 2024-12-05 DIAGNOSIS — N84.1 CERVICAL POLYP: ICD-10-CM

## 2024-12-05 DIAGNOSIS — O03.9 SPONTANEOUS ABORTION: ICD-10-CM

## 2024-12-05 DIAGNOSIS — D21.9 FIBROIDS: ICD-10-CM

## 2024-12-05 DIAGNOSIS — N80.9 ENDOMETRIOSIS: Primary | ICD-10-CM

## 2024-12-05 PROCEDURE — 0503F POSTPARTUM CARE VISIT: CPT | Performed by: STUDENT IN AN ORGANIZED HEALTH CARE EDUCATION/TRAINING PROGRAM

## 2024-12-05 NOTE — PROGRESS NOTES
Lidia and polyp removal prior to conceiving this pregnancy      Crushing injury of finger, left 02/10/2017       Return for 4 weeks.    Patient was seen with total face to face time of 20 minutes. More than 50% of this visit was on counseling and education regarding her diagnose(s) as listed below and options. She was also counseled on her preventative health maintenance recommendations and follow-up.      Diagnosis Orders   1. Endometriosis        2. Fibroids        3. Cervical polyp        4. Spontaneous             Dr. Geraldine Berger  Obstetrician Gynecologist  [facility]  2024, 9:49 AM

## 2024-12-09 PROBLEM — O03.9 SPONTANEOUS ABORTION: Status: ACTIVE | Noted: 2024-12-09

## 2025-01-07 NOTE — PROGRESS NOTES
[facility]  [address]  [address]  [facility] [address]  Dept: [phone]    Keiko Araujo  2025  10:30 AM        Keiko Araujo is a 34 y.o. female       The patient was seen and examined. She has no chief complaints today. She delivered a 15 week loss in the ER about 5 weeks ago.  She says they are doing ok.  She is on 50 mg zoloft and is doing well because she doesn't like feeling numb with 100 mg.  The patient completed the E.P.D.S. Evaluation form and scored 12.  She does not have any signs or symptoms of post partum depression. She denies any suicidal thoughts with a plan, intent to harm others, and delusional ideas.   Today she says her bleeding stopped and she started a period 2 weeks ago.  She says it was a little lighter than her normal period and a little less painful.      Her pregnancy was complicated by:  Patient Active Problem List    Diagnosis Date Noted    Spontaneous  @15 weeks 2024     Overview Note:     Patient presented to the ED with bleeding and cramping, delivered the fetus shortly after.  She was bleeding the entire weekend prior to delivery.      Vagina bleeding 2024    Pre-Pregnancy BMI 35 10/21/2024     Overview Note:     Early 1 hr GTT ordered  NST/BPPs @ 37 weeks      Hx LE swelling in previous pregnancy 10/21/2024     Overview Note:     Patient had dopplers to rule out DVT.  Report was NEGATIVE for DVT.    Notes are in CareEverywhere.      Family history of diabetes mellitus 10/07/2024     Overview Note:     Patient's father, was an alcoholic      Cervical polyp 10/07/2024     Overview Note:     Noted during initial prenatal exam. Pt reports spotting with bowel movements      Endometriosis determined by laparoscopy 10/07/2024     Overview Note:     Patient reports laparoscopy diagnosed endo in [place]. States she needed to have surgery prior to being able to

## 2025-01-08 ENCOUNTER — POSTPARTUM VISIT (OUTPATIENT)
Dept: OBGYN CLINIC | Age: 35
End: 2025-01-08

## 2025-01-08 VITALS
HEART RATE: 85 BPM | BODY MASS INDEX: 35.71 KG/M2 | DIASTOLIC BLOOD PRESSURE: 82 MMHG | SYSTOLIC BLOOD PRESSURE: 132 MMHG | WEIGHT: 241.8 LBS

## 2025-01-08 DIAGNOSIS — O03.9 FOLLOW-UP VISIT AFTER MISCARRIAGE: ICD-10-CM

## 2025-01-08 DIAGNOSIS — Z51.89 FOLLOW-UP VISIT AFTER MISCARRIAGE: ICD-10-CM

## 2025-01-08 PROCEDURE — 0503F POSTPARTUM CARE VISIT: CPT | Performed by: OBSTETRICS & GYNECOLOGY

## 2025-01-24 ENCOUNTER — TELEPHONE (OUTPATIENT)
Dept: OBGYN CLINIC | Age: 35
End: 2025-01-24

## 2025-01-24 NOTE — TELEPHONE ENCOUNTER
Pt calling [facility] testing was done. Pt waiting on report but it had not released to her from portal. Told pt that we could go into portal and release testing.      Pt states that  had been her primary care provider through out most of this process.

## 2025-04-08 ENCOUNTER — INITIAL CONSULT (OUTPATIENT)
Dept: OBGYN CLINIC | Age: 35
End: 2025-04-08
Payer: COMMERCIAL

## 2025-04-08 VITALS
WEIGHT: 233.4 LBS | SYSTOLIC BLOOD PRESSURE: 101 MMHG | BODY MASS INDEX: 34.47 KG/M2 | HEART RATE: 86 BPM | DIASTOLIC BLOOD PRESSURE: 64 MMHG

## 2025-04-08 DIAGNOSIS — N84.1 CERVICAL POLYP: ICD-10-CM

## 2025-04-08 DIAGNOSIS — D21.9 FIBROIDS: ICD-10-CM

## 2025-04-08 DIAGNOSIS — N80.9 ENDOMETRIOSIS DETERMINED BY LAPAROSCOPY: Primary | ICD-10-CM

## 2025-04-08 PROCEDURE — G8417 CALC BMI ABV UP PARAM F/U: HCPCS | Performed by: STUDENT IN AN ORGANIZED HEALTH CARE EDUCATION/TRAINING PROGRAM

## 2025-04-08 PROCEDURE — 99213 OFFICE O/P EST LOW 20 MIN: CPT | Performed by: STUDENT IN AN ORGANIZED HEALTH CARE EDUCATION/TRAINING PROGRAM

## 2025-04-08 PROCEDURE — G8427 DOCREV CUR MEDS BY ELIG CLIN: HCPCS | Performed by: STUDENT IN AN ORGANIZED HEALTH CARE EDUCATION/TRAINING PROGRAM

## 2025-04-08 PROCEDURE — 1036F TOBACCO NON-USER: CPT | Performed by: STUDENT IN AN ORGANIZED HEALTH CARE EDUCATION/TRAINING PROGRAM

## 2025-04-08 SDOH — ECONOMIC STABILITY: FOOD INSECURITY: WITHIN THE PAST 12 MONTHS, YOU WORRIED THAT YOUR FOOD WOULD RUN OUT BEFORE YOU GOT MONEY TO BUY MORE.: NEVER TRUE

## 2025-04-08 SDOH — ECONOMIC STABILITY: FOOD INSECURITY: WITHIN THE PAST 12 MONTHS, THE FOOD YOU BOUGHT JUST DIDN'T LAST AND YOU DIDN'T HAVE MONEY TO GET MORE.: NEVER TRUE

## 2025-04-08 NOTE — PROGRESS NOTES
OB/GYN Problem Visit    Keiko Araujo  2025                       Primary Care Physician: No primary care provider on file.    CC:   Chief Complaint   Patient presents with    Consultation         HPI: Keiko Araujo is a 35 y.o. female     The patient was seen and examined. She is here today to discuss surgical management of endometriosis and polyps. Patient had laparoscopy in the past for endometriosis. She recently suffered a 15 week loss and is concerned her endometriosis may have played a role.Prior to her first pregnancy for had laparoscopy and conceived on the next cycle and carried to term. She wants to make sure she is medically optimized prior to conceiving again.     REVIEW OF SYSTEMS:   Constitutional: negative fever, negative chills   HEENT: negative visual disturbances, negative headaches  Respiratory: negative dyspnea, negative cough  Cardiovascular: negative chest pain,  negative palpitations  Gastrointestinal: negative abdominal pain, negative RUQ pain, negative N/V, negative diarrhea, negative constipation  Genitourinary: negative dysuria, negative vaginal discharge  Dermatological: negative rash  Hematologic: negative bruising  Immunologic/Lymphatic: negative recent illness, negative recent sick contact  Musculoskeletal: negative back pain, negative myalgias, negative arthralgias  Neurological:  negative dizziness, negative weakness  Behavior/Psych: negative depression, negative anxiety      OBSTETRICAL HISTORY:  OB History    Para Term  AB Living   2 1 1 0 1 1   SAB IAB Ectopic Molar Multiple Live Births   1     1      # Outcome Date GA Lbr Ji/2nd Weight Sex Type Anes PTL Lv   2 SAB 24 15w0d       FD   1 Term 21 40w1d 03:14 / 00:27 3.25 kg (7 lb 2.6 oz) F Vag-Spont EPI N ISABEL       PAST MEDICAL HISTORY:      Diagnosis Date    Abnormal Pap smear of cervix     Anemia     prior to pregnancy and during 1st pregnancy    Endometriosis 2020    Postpartum

## 2025-04-09 ENCOUNTER — PREP FOR PROCEDURE (OUTPATIENT)
Dept: OBGYN CLINIC | Age: 35
End: 2025-04-09

## 2025-04-09 ENCOUNTER — TELEPHONE (OUTPATIENT)
Dept: OBGYN CLINIC | Age: 35
End: 2025-04-09

## 2025-04-09 DIAGNOSIS — D21.9 FIBROIDS: ICD-10-CM

## 2025-04-09 DIAGNOSIS — Z01.818 PRE-OP TESTING: Primary | ICD-10-CM

## 2025-04-09 DIAGNOSIS — N80.9 ENDOMETRIOSIS: ICD-10-CM

## 2025-05-14 RX ORDER — SODIUM CHLORIDE, SODIUM LACTATE, POTASSIUM CHLORIDE, CALCIUM CHLORIDE 600; 310; 30; 20 MG/100ML; MG/100ML; MG/100ML; MG/100ML
INJECTION, SOLUTION INTRAVENOUS CONTINUOUS
OUTPATIENT
Start: 2025-05-14

## 2025-05-14 NOTE — DISCHARGE INSTRUCTIONS
Preoperative Instructions:    Stop eating solid foods at midnight the night prior to surgery.    Stop drinking clear liquids at midnight the night prior to surgery.    Arrive at the surgery center (Entrance B) by 7:30 on 6/2/2025  (or as directed by your surgeon's office).    If you have been given a blood band, you must bring it with you the day of surgery.    Please stop any blood thinning medications as directed by your surgeon or prescribing physician. Failure to stop certain medications may interfere with your scheduled surgery.    These may include:  Aspirin, Warfarin (Coumadin), Clopidogrel (Plavix), Ibuprofen (Motrin, Advil), Naproxen (Aleve), Meloxicam (Mobic), Celecoxib (Celebrex), Eliquis, Pradaxa, Xarelto, Effient, Fish Oil, Herbal supplements.  Advil/ibuprofen - 7 days    You may continue the rest of your medications through the night before surgery unless instructed otherwise.    Please take only the following medication(s) the day of surgery with a small sip of water:  zoloft    Please use and bring inhalers the day of surgery, if applies.  Please bring CPAP the day of surgery, if applies.    PLEASE NOTE:  THE ABOVE (IF ANY) DISCONTINUED MEDS MAY ONLY BE FROM   \"CLEANING UP\" THE MED LIST AND WERE NOT ACTUALLY CANCELLED; SEE CHART FOR DETAILS AND ALWAYS CHECK WITH PRESCRIBING PROVIDER BEFORE DISCONTINUING ANY MEDICATIONS        REMINDERS:  ** If you are going home the day of your procedure, you will need a friend or family member to drive you home after your procedure.  Your  must be 18 years of age or older and able to sign off on your discharge instructions.  Taxi cabs or any form of public transportation is not acceptable.    ** It is preferable that the friend or family member stay at the hospital throughout your procedure.  ** If you are going home the same day as your procedure, someone must remain with you for the first 24 hours after your surgery if you receive anesthesia or sedation.  If

## 2025-05-20 ENCOUNTER — RESULTS FOLLOW-UP (OUTPATIENT)
Dept: OBGYN CLINIC | Age: 35
End: 2025-05-20

## 2025-05-20 ENCOUNTER — HOSPITAL ENCOUNTER (OUTPATIENT)
Dept: PREADMISSION TESTING | Age: 35
Discharge: HOME | End: 2025-05-24
Payer: COMMERCIAL

## 2025-05-20 VITALS
DIASTOLIC BLOOD PRESSURE: 65 MMHG | HEIGHT: 69 IN | TEMPERATURE: 98.2 F | WEIGHT: 233 LBS | OXYGEN SATURATION: 97 % | SYSTOLIC BLOOD PRESSURE: 116 MMHG | HEART RATE: 80 BPM | RESPIRATION RATE: 18 BRPM | BODY MASS INDEX: 34.51 KG/M2

## 2025-05-20 DIAGNOSIS — Z01.818 PRE-OP TESTING: ICD-10-CM

## 2025-05-20 LAB
ABO + RH BLD: NORMAL
ANION GAP SERPL CALCULATED.3IONS-SCNC: 10 MMOL/L (ref 9–16)
ARM BAND NUMBER: NORMAL
BASOPHILS # BLD: 0.04 K/UL (ref 0–0.2)
BASOPHILS NFR BLD: 1 % (ref 0–2)
BLOOD BANK SAMPLE EXPIRATION: NORMAL
BLOOD GROUP ANTIBODIES SERPL: NEGATIVE
BUN SERPL-MCNC: 19 MG/DL (ref 6–20)
CALCIUM SERPL-MCNC: 9.4 MG/DL (ref 8.6–10.4)
CHLORIDE SERPL-SCNC: 105 MMOL/L (ref 98–107)
CO2 SERPL-SCNC: 25 MMOL/L (ref 20–31)
CREAT SERPL-MCNC: 0.6 MG/DL (ref 0.6–0.9)
EOSINOPHIL # BLD: 0.15 K/UL (ref 0–0.44)
EOSINOPHILS RELATIVE PERCENT: 2 % (ref 1–4)
ERYTHROCYTE [DISTWIDTH] IN BLOOD BY AUTOMATED COUNT: 13.2 % (ref 11.8–14.4)
GFR, ESTIMATED: >90 ML/MIN/1.73M2
GLUCOSE SERPL-MCNC: 105 MG/DL (ref 74–99)
HCT VFR BLD AUTO: 35 % (ref 36.3–47.1)
HGB BLD-MCNC: 11 G/DL (ref 11.9–15.1)
IMM GRANULOCYTES # BLD AUTO: <0.03 K/UL (ref 0–0.3)
IMM GRANULOCYTES NFR BLD: 0 %
LYMPHOCYTES NFR BLD: 1.35 K/UL (ref 1.1–3.7)
LYMPHOCYTES RELATIVE PERCENT: 16 % (ref 24–43)
MCH RBC QN AUTO: 27.6 PG (ref 25.2–33.5)
MCHC RBC AUTO-ENTMCNC: 31.4 G/DL (ref 28.4–34.8)
MCV RBC AUTO: 87.9 FL (ref 82.6–102.9)
MONOCYTES NFR BLD: 0.66 K/UL (ref 0.1–1.2)
MONOCYTES NFR BLD: 8 % (ref 3–12)
NEUTROPHILS NFR BLD: 73 % (ref 36–65)
NEUTS SEG NFR BLD: 6.06 K/UL (ref 1.5–8.1)
NRBC BLD-RTO: 0 PER 100 WBC
PLATELET # BLD AUTO: 266 K/UL (ref 138–453)
PMV BLD AUTO: 10.1 FL (ref 8.1–13.5)
POTASSIUM SERPL-SCNC: 4.3 MMOL/L (ref 3.7–5.3)
RBC # BLD AUTO: 3.98 M/UL (ref 3.95–5.11)
SODIUM SERPL-SCNC: 140 MMOL/L (ref 136–145)
WBC OTHER # BLD: 8.3 K/UL (ref 3.5–11.3)

## 2025-05-20 PROCEDURE — 86901 BLOOD TYPING SEROLOGIC RH(D): CPT

## 2025-05-20 PROCEDURE — 36415 COLL VENOUS BLD VENIPUNCTURE: CPT

## 2025-05-20 PROCEDURE — 86900 BLOOD TYPING SEROLOGIC ABO: CPT

## 2025-05-20 PROCEDURE — 86850 RBC ANTIBODY SCREEN: CPT

## 2025-05-20 PROCEDURE — 80048 BASIC METABOLIC PNL TOTAL CA: CPT

## 2025-05-20 PROCEDURE — 85025 COMPLETE CBC W/AUTO DIFF WBC: CPT

## 2025-05-20 RX ORDER — IBUPROFEN 800 MG/1
800 TABLET, FILM COATED ORAL EVERY 6 HOURS PRN
COMMUNITY

## 2025-05-20 NOTE — PROGRESS NOTES
Anesthesia Focused Assessment      STOP-BANG Sleep Apnea Questionnaire    SNORE loudly (heard through closed doors)?   No  TIRED, fatigued, sleepy during daytime?    No  OBSERVED stopping breathing during sleep?   No  High blood PRESSURE being treated?    No    BMI over 35?        No  AGE over 50?        No  NECK circumference over 16\"?     No  GENDER (male)?       No             Total 0  High risk 5-8  Intermediate risk 3-4  Low risk 0-2    Obstructive Sleep Apnea: denies  If YES, machine used: no     Type 1 DM:   no  T2DM:  no    Coronary Artery Disease:  no  Hypertension:  no    Active smoker:  no  Drinks alcohol:  rarely  Recreational drugs: no    Dentition: molar crown    Defib / AICD / Pacemaker: no      Renal Failure/dialysis:  no    Patient was evaluated in PAT & anesthesia guidelines were applied.   NPO guidelines, medication instructions and scheduled arrival time were reviewed with patient.  I advised patient to please contact the surgeon's office, ahead of time if possible, if any new signs or symptoms of illness, infection, rash, etc    Hx of anesthesia complications:  PONV x 1, no issues with subsequent procedures.  Family hx of anesthesia complications:  no                                                                                                                     Patient is overall active, without cardiac/pulmonary complaints, METS greater than 4.    JOSE CRUZ SANDOVAL PA-C  5/20/25  1:34 PM

## 2025-06-01 NOTE — H&P
OB/GYN Pre-Op H&P  [facility]    Patient Name: Kenan Araujo     Patient : 1990  Room/Bed: [facility] OR [facility]/NONE  Admission Date/Time: 2025  7:31 AM  Primary Care Physician: Gabe Velásquez MD  MRN: [id number]    Date: 2025  Time: 9:10 AM    The patient was seen in pre-op holding. She is here for dilatation and curettage hysteroscopy, diagnostic laparoscopy, fulguration of implants, possible myomectomy.    Patient has a history of endometriosis diagnosed previously on laparoscopy. She also has a history of endometrial polyps as well. Prior to her first pregnancy, she underwent laparoscopy and was able to conceive during her next cycle and was able to carry at term. She had a 15 week fetal loss and is concerned that her endometriosis may have played a role. She wants to ensure that she is medically optimized prior to conceiving again. Proceed with procedure as planned.     The procedure risks and complications were reviewed.  The labs, Consent, and H&P were reviewed and updated.  The patient was counseled on the possibility of  the need of a second surgery.  The patient voiced understanding and had all of her questions answered. The possibility of incomplete removal of abnormal tissue was discussed.    OBSTETRICAL HISTORY:   OB History    Para Term  AB Living   2 1 1 0 1 1   SAB IAB Ectopic Molar Multiple Live Births   1 0 0 0 0 1      # Outcome Date GA Lbr Ji/2nd Weight Sex Type Anes PTL Lv   2 SAB 24 15w0d       FD   1 Term 21 40w1d 03:14 / 00:27 3.25 kg F Vag-Spont EPI N ISABEL      Name: ELIAS,BABY GIRL KENAN      Apgar1: 8  Apgar5: 9       PAST MEDICAL HISTORY:   has a past medical history of Abnormal Pap smear of cervix, Anemia, Cervical polyp, Endometriosis, Fibroids, PONV (postoperative nausea and vomiting), Postpartum depression, Spontaneous , and Under care of team.    PAST SURGICAL HISTORY:   has a past surgical history that includes

## 2025-06-01 NOTE — OP NOTE
Operative Note  Department of Obstetrics and Gynecology  [facility]       Patient: Keiko Araujo   : 1990  MRN: [id number]       Acct: [id number]   PCP: Gabe Velásquez MD  Date of Procedure: 25    Pre-operative Diagnosis: 35 y.o. female    History of endometriosis  History of intrauterine polyps  History of cervical polyp  History of uterine fibroids  History of unexplained fetal demise at 15 weeks  BMI 34    Post-operative Diagnosis: same  Posterior uterocervical fibroid  History of endometriosis  History of intrauterine polyps  History of cervical polyp  History of unexplained fetal demise at 15 weeks  BMI 34    Procedure: Diagnostic Laparoscopy, Hysteroscopy with dilation and curettage    Surgeon: Dr. Berger  Assistants: Oscar Che MD    Anesthesia: general via ETT    Indications: Patient has a history of endometriosis diagnosed previously on laparoscopy. She also has a history of endometrial polyps as well. Prior to her first pregnancy, she underwent laparoscopy and was able to conceive during her next cycle and was able to carry at term. She had a 15 week fetal loss and is concerned that her endometriosis may have played a role. She wants to ensure that she is medically optimized prior to conceiving again. Proceed with procedure as planned.     Procedure Details   The patient was seen in the pre-op room. The risks, benefits, complications, treatment options, and expected outcomes were discussed with the patient. The patient concurred with the proposed plan, giving informed consent. The patient was taken to the Operating Room and identified as Keiko Araujo and the procedure was verified. A Time Out was held and the above information confirmed.     After administration of general anesthesia, the patient was placed in the dorsolithotomy position with Yellofin stirrups and examination under general anesthesia was performed with findings as noted below. A jaramillo

## 2025-06-02 ENCOUNTER — ANESTHESIA EVENT (OUTPATIENT)
Dept: OPERATING ROOM | Age: 35
End: 2025-06-02
Payer: COMMERCIAL

## 2025-06-02 ENCOUNTER — ANESTHESIA (OUTPATIENT)
Dept: OPERATING ROOM | Age: 35
End: 2025-06-02
Payer: COMMERCIAL

## 2025-06-02 ENCOUNTER — HOSPITAL ENCOUNTER (OUTPATIENT)
Age: 35
Setting detail: OUTPATIENT SURGERY
Discharge: HOME | End: 2025-06-02
Attending: STUDENT IN AN ORGANIZED HEALTH CARE EDUCATION/TRAINING PROGRAM | Admitting: STUDENT IN AN ORGANIZED HEALTH CARE EDUCATION/TRAINING PROGRAM
Payer: COMMERCIAL

## 2025-06-02 VITALS
HEART RATE: 69 BPM | SYSTOLIC BLOOD PRESSURE: 94 MMHG | OXYGEN SATURATION: 99 % | RESPIRATION RATE: 18 BRPM | TEMPERATURE: 96.8 F | DIASTOLIC BLOOD PRESSURE: 77 MMHG

## 2025-06-02 DIAGNOSIS — D21.9 FIBROIDS: ICD-10-CM

## 2025-06-02 DIAGNOSIS — N80.9 ENDOMETRIOSIS: ICD-10-CM

## 2025-06-02 DIAGNOSIS — O03.9 SPONTANEOUS ABORTION: ICD-10-CM

## 2025-06-02 DIAGNOSIS — N84.1 CERVICAL POLYP: ICD-10-CM

## 2025-06-02 DIAGNOSIS — Z98.890 POST-OPERATIVE STATE: Primary | ICD-10-CM

## 2025-06-02 PROCEDURE — 2580000003 HC RX 258: Performed by: ANESTHESIOLOGY

## 2025-06-02 PROCEDURE — 7100000001 HC PACU RECOVERY - ADDTL 15 MIN: Performed by: STUDENT IN AN ORGANIZED HEALTH CARE EDUCATION/TRAINING PROGRAM

## 2025-06-02 PROCEDURE — 6360000002 HC RX W HCPCS: Performed by: REGISTERED NURSE

## 2025-06-02 PROCEDURE — 3600000014 HC SURGERY LEVEL 4 ADDTL 15MIN: Performed by: STUDENT IN AN ORGANIZED HEALTH CARE EDUCATION/TRAINING PROGRAM

## 2025-06-02 PROCEDURE — 2500000003 HC RX 250 WO HCPCS: Performed by: REGISTERED NURSE

## 2025-06-02 PROCEDURE — 7100000000 HC PACU RECOVERY - FIRST 15 MIN: Performed by: STUDENT IN AN ORGANIZED HEALTH CARE EDUCATION/TRAINING PROGRAM

## 2025-06-02 PROCEDURE — 3700000000 HC ANESTHESIA ATTENDED CARE: Performed by: STUDENT IN AN ORGANIZED HEALTH CARE EDUCATION/TRAINING PROGRAM

## 2025-06-02 PROCEDURE — 3600000004 HC SURGERY LEVEL 4 BASE: Performed by: STUDENT IN AN ORGANIZED HEALTH CARE EDUCATION/TRAINING PROGRAM

## 2025-06-02 PROCEDURE — 81025 URINE PREGNANCY TEST: CPT

## 2025-06-02 PROCEDURE — 7100000011 HC PHASE II RECOVERY - ADDTL 15 MIN: Performed by: STUDENT IN AN ORGANIZED HEALTH CARE EDUCATION/TRAINING PROGRAM

## 2025-06-02 PROCEDURE — 7100000010 HC PHASE II RECOVERY - FIRST 15 MIN: Performed by: STUDENT IN AN ORGANIZED HEALTH CARE EDUCATION/TRAINING PROGRAM

## 2025-06-02 PROCEDURE — 3700000001 HC ADD 15 MINUTES (ANESTHESIA): Performed by: STUDENT IN AN ORGANIZED HEALTH CARE EDUCATION/TRAINING PROGRAM

## 2025-06-02 PROCEDURE — 2720000010 HC SURG SUPPLY STERILE: Performed by: STUDENT IN AN ORGANIZED HEALTH CARE EDUCATION/TRAINING PROGRAM

## 2025-06-02 PROCEDURE — 6370000000 HC RX 637 (ALT 250 FOR IP): Performed by: STUDENT IN AN ORGANIZED HEALTH CARE EDUCATION/TRAINING PROGRAM

## 2025-06-02 PROCEDURE — 2709999900 HC NON-CHARGEABLE SUPPLY: Performed by: STUDENT IN AN ORGANIZED HEALTH CARE EDUCATION/TRAINING PROGRAM

## 2025-06-02 PROCEDURE — 88305 TISSUE EXAM BY PATHOLOGIST: CPT

## 2025-06-02 PROCEDURE — 2500000003 HC RX 250 WO HCPCS: Performed by: STUDENT IN AN ORGANIZED HEALTH CARE EDUCATION/TRAINING PROGRAM

## 2025-06-02 RX ORDER — ONDANSETRON 2 MG/ML
INJECTION INTRAMUSCULAR; INTRAVENOUS
Status: DISCONTINUED | OUTPATIENT
Start: 2025-06-02 | End: 2025-06-02 | Stop reason: SDUPTHER

## 2025-06-02 RX ORDER — SODIUM CHLORIDE 9 MG/ML
INJECTION, SOLUTION INTRAVENOUS PRN
Status: DISCONTINUED | OUTPATIENT
Start: 2025-06-02 | End: 2025-06-02 | Stop reason: HOSPADM

## 2025-06-02 RX ORDER — LIDOCAINE HYDROCHLORIDE 10 MG/ML
INJECTION, SOLUTION EPIDURAL; INFILTRATION; INTRACAUDAL; PERINEURAL
Status: DISCONTINUED | OUTPATIENT
Start: 2025-06-02 | End: 2025-06-02 | Stop reason: SDUPTHER

## 2025-06-02 RX ORDER — SODIUM CHLORIDE 0.9 % (FLUSH) 0.9 %
5-40 SYRINGE (ML) INJECTION EVERY 12 HOURS SCHEDULED
Status: DISCONTINUED | OUTPATIENT
Start: 2025-06-02 | End: 2025-06-02 | Stop reason: HOSPADM

## 2025-06-02 RX ORDER — ACETAMINOPHEN 500 MG
1000 TABLET ORAL EVERY 6 HOURS PRN
Qty: 180 TABLET | Refills: 0 | Status: SHIPPED | OUTPATIENT
Start: 2025-06-02 | End: 2025-07-02

## 2025-06-02 RX ORDER — HYDRALAZINE HYDROCHLORIDE 20 MG/ML
10 INJECTION INTRAMUSCULAR; INTRAVENOUS
Status: DISCONTINUED | OUTPATIENT
Start: 2025-06-02 | End: 2025-06-02 | Stop reason: HOSPADM

## 2025-06-02 RX ORDER — ROCURONIUM BROMIDE 10 MG/ML
INJECTION, SOLUTION INTRAVENOUS
Status: DISCONTINUED | OUTPATIENT
Start: 2025-06-02 | End: 2025-06-02 | Stop reason: SDUPTHER

## 2025-06-02 RX ORDER — IPRATROPIUM BROMIDE AND ALBUTEROL SULFATE 2.5; .5 MG/3ML; MG/3ML
1 SOLUTION RESPIRATORY (INHALATION)
Status: DISCONTINUED | OUTPATIENT
Start: 2025-06-02 | End: 2025-06-02 | Stop reason: HOSPADM

## 2025-06-02 RX ORDER — MAGNESIUM HYDROXIDE 1200 MG/15ML
LIQUID ORAL CONTINUOUS PRN
Status: DISCONTINUED | OUTPATIENT
Start: 2025-06-02 | End: 2025-06-02 | Stop reason: HOSPADM

## 2025-06-02 RX ORDER — IBUPROFEN 600 MG/1
600 TABLET, FILM COATED ORAL EVERY 6 HOURS PRN
Qty: 90 TABLET | Refills: 0 | Status: SHIPPED | OUTPATIENT
Start: 2025-06-02 | End: 2025-07-02

## 2025-06-02 RX ORDER — NALOXONE HYDROCHLORIDE 0.4 MG/ML
INJECTION, SOLUTION INTRAMUSCULAR; INTRAVENOUS; SUBCUTANEOUS PRN
Status: DISCONTINUED | OUTPATIENT
Start: 2025-06-02 | End: 2025-06-02 | Stop reason: HOSPADM

## 2025-06-02 RX ORDER — OXYCODONE HYDROCHLORIDE 5 MG/1
5 TABLET ORAL EVERY 6 HOURS PRN
Qty: 3 TABLET | Refills: 0 | Status: SHIPPED | OUTPATIENT
Start: 2025-06-02 | End: 2025-06-05

## 2025-06-02 RX ORDER — DEXAMETHASONE SODIUM PHOSPHATE 10 MG/ML
INJECTION, SOLUTION INTRA-ARTICULAR; INTRALESIONAL; INTRAMUSCULAR; INTRAVENOUS; SOFT TISSUE
Status: DISCONTINUED | OUTPATIENT
Start: 2025-06-02 | End: 2025-06-02 | Stop reason: SDUPTHER

## 2025-06-02 RX ORDER — OXYCODONE HYDROCHLORIDE 5 MG/1
5 TABLET ORAL
Status: DISCONTINUED | OUTPATIENT
Start: 2025-06-02 | End: 2025-06-02 | Stop reason: HOSPADM

## 2025-06-02 RX ORDER — SODIUM CHLORIDE, SODIUM LACTATE, POTASSIUM CHLORIDE, CALCIUM CHLORIDE 600; 310; 30; 20 MG/100ML; MG/100ML; MG/100ML; MG/100ML
INJECTION, SOLUTION INTRAVENOUS CONTINUOUS
Status: DISCONTINUED | OUTPATIENT
Start: 2025-06-02 | End: 2025-06-02 | Stop reason: HOSPADM

## 2025-06-02 RX ORDER — DIPHENHYDRAMINE HYDROCHLORIDE 50 MG/ML
INJECTION, SOLUTION INTRAMUSCULAR; INTRAVENOUS
Status: DISCONTINUED | OUTPATIENT
Start: 2025-06-02 | End: 2025-06-02 | Stop reason: SDUPTHER

## 2025-06-02 RX ORDER — PROCHLORPERAZINE EDISYLATE 5 MG/ML
5 INJECTION INTRAMUSCULAR; INTRAVENOUS
Status: DISCONTINUED | OUTPATIENT
Start: 2025-06-02 | End: 2025-06-02 | Stop reason: HOSPADM

## 2025-06-02 RX ORDER — PHENYLEPHRINE HCL IN 0.9% NACL 1 MG/10 ML
SYRINGE (ML) INTRAVENOUS
Status: DISCONTINUED | OUTPATIENT
Start: 2025-06-02 | End: 2025-06-02 | Stop reason: SDUPTHER

## 2025-06-02 RX ORDER — LABETALOL HYDROCHLORIDE 5 MG/ML
10 INJECTION, SOLUTION INTRAVENOUS
Status: DISCONTINUED | OUTPATIENT
Start: 2025-06-02 | End: 2025-06-02 | Stop reason: HOSPADM

## 2025-06-02 RX ORDER — SIMETHICONE 80 MG
80 TABLET,CHEWABLE ORAL 4 TIMES DAILY PRN
Qty: 28 TABLET | Refills: 0 | Status: SHIPPED | OUTPATIENT
Start: 2025-06-02 | End: 2025-06-09

## 2025-06-02 RX ORDER — KETOROLAC TROMETHAMINE 30 MG/ML
INJECTION, SOLUTION INTRAMUSCULAR; INTRAVENOUS
Status: DISCONTINUED | OUTPATIENT
Start: 2025-06-02 | End: 2025-06-02 | Stop reason: SDUPTHER

## 2025-06-02 RX ORDER — SCOPOLAMINE 1 MG/3D
1 PATCH, EXTENDED RELEASE TRANSDERMAL
Status: DISCONTINUED | OUTPATIENT
Start: 2025-06-02 | End: 2025-06-02 | Stop reason: HOSPADM

## 2025-06-02 RX ORDER — BUPIVACAINE HYDROCHLORIDE AND EPINEPHRINE 5; 5 MG/ML; UG/ML
INJECTION, SOLUTION EPIDURAL; INTRACAUDAL; PERINEURAL PRN
Status: DISCONTINUED | OUTPATIENT
Start: 2025-06-02 | End: 2025-06-02 | Stop reason: HOSPADM

## 2025-06-02 RX ORDER — METOCLOPRAMIDE HYDROCHLORIDE 5 MG/ML
10 INJECTION INTRAMUSCULAR; INTRAVENOUS
Status: DISCONTINUED | OUTPATIENT
Start: 2025-06-02 | End: 2025-06-02 | Stop reason: HOSPADM

## 2025-06-02 RX ORDER — PROPOFOL 10 MG/ML
INJECTION, EMULSION INTRAVENOUS
Status: DISCONTINUED | OUTPATIENT
Start: 2025-06-02 | End: 2025-06-02 | Stop reason: SDUPTHER

## 2025-06-02 RX ORDER — SODIUM CHLORIDE 0.9 % (FLUSH) 0.9 %
5-40 SYRINGE (ML) INJECTION PRN
Status: DISCONTINUED | OUTPATIENT
Start: 2025-06-02 | End: 2025-06-02 | Stop reason: HOSPADM

## 2025-06-02 RX ORDER — ONDANSETRON 4 MG/1
4 TABLET, FILM COATED ORAL 3 TIMES DAILY PRN
Qty: 15 TABLET | Refills: 0 | Status: SHIPPED | OUTPATIENT
Start: 2025-06-02

## 2025-06-02 RX ORDER — FENTANYL CITRATE 50 UG/ML
INJECTION, SOLUTION INTRAMUSCULAR; INTRAVENOUS
Status: DISCONTINUED | OUTPATIENT
Start: 2025-06-02 | End: 2025-06-02 | Stop reason: SDUPTHER

## 2025-06-02 RX ADMIN — ROCURONIUM BROMIDE 20 MG: 10 INJECTION, SOLUTION INTRAVENOUS at 10:05

## 2025-06-02 RX ADMIN — Medication 200 MCG: at 10:12

## 2025-06-02 RX ADMIN — SODIUM CHLORIDE, POTASSIUM CHLORIDE, SODIUM LACTATE AND CALCIUM CHLORIDE: 600; 310; 30; 20 INJECTION, SOLUTION INTRAVENOUS at 08:53

## 2025-06-02 RX ADMIN — Medication 100 MCG: at 10:32

## 2025-06-02 RX ADMIN — DIPHENHYDRAMINE HYDROCHLORIDE 12.5 MG: 50 INJECTION INTRAMUSCULAR; INTRAVENOUS at 09:44

## 2025-06-02 RX ADMIN — KETOROLAC TROMETHAMINE 30 MG: 30 INJECTION, SOLUTION INTRAMUSCULAR at 10:38

## 2025-06-02 RX ADMIN — LIDOCAINE HYDROCHLORIDE 50 MG: 10 INJECTION, SOLUTION EPIDURAL; INFILTRATION; INTRACAUDAL; PERINEURAL at 09:37

## 2025-06-02 RX ADMIN — PROPOFOL 150 MG: 10 INJECTION, EMULSION INTRAVENOUS at 09:37

## 2025-06-02 RX ADMIN — SUGAMMADEX 300 MG: 100 INJECTION, SOLUTION INTRAVENOUS at 10:45

## 2025-06-02 RX ADMIN — ROCURONIUM BROMIDE 50 MG: 10 INJECTION, SOLUTION INTRAVENOUS at 09:38

## 2025-06-02 RX ADMIN — FENTANYL CITRATE 100 MCG: 50 INJECTION, SOLUTION INTRAMUSCULAR; INTRAVENOUS at 09:37

## 2025-06-02 RX ADMIN — Medication 100 MCG: at 09:57

## 2025-06-02 RX ADMIN — SODIUM CHLORIDE, POTASSIUM CHLORIDE, SODIUM LACTATE AND CALCIUM CHLORIDE: 600; 310; 30; 20 INJECTION, SOLUTION INTRAVENOUS at 10:30

## 2025-06-02 RX ADMIN — DEXAMETHASONE SODIUM PHOSPHATE 10 MG: 10 INJECTION INTRAMUSCULAR; INTRAVENOUS at 09:47

## 2025-06-02 RX ADMIN — Medication 100 MCG: at 10:04

## 2025-06-02 RX ADMIN — ONDANSETRON 4 MG: 2 INJECTION, SOLUTION INTRAMUSCULAR; INTRAVENOUS at 10:38

## 2025-06-02 ASSESSMENT — PAIN SCALES - GENERAL
PAINLEVEL_OUTOF10: 4
PAINLEVEL_OUTOF10: 0
PAINLEVEL_OUTOF10: 4
PAINLEVEL_OUTOF10: 0
PAINLEVEL_OUTOF10: 0
PAINLEVEL_OUTOF10: 4
PAINLEVEL_OUTOF10: 0
PAINLEVEL_OUTOF10: 0

## 2025-06-02 ASSESSMENT — PAIN - FUNCTIONAL ASSESSMENT: PAIN_FUNCTIONAL_ASSESSMENT: 0-10

## 2025-06-02 NOTE — ANESTHESIA PRE PROCEDURE
Department of Anesthesiology  Preprocedure Note       Name:  Keiko Araujo   Age:  35 y.o.  :  1990                                          MRN:  [id number]         Date:  2025      Surgeon: Surgeon(s):  Geraldine Berger DO    Procedure: Procedure(s):  DILATATION AND CURETTAGE HYSTEROSCOPY AVETA,  DIAGNOSTIC LAPAROSCOPY, FULGURATION OF IMPLANTS, POSSIBLE MYOMECTOMY (ARGON BEAM)    Medications prior to admission:   Prior to Admission medications    Medication Sig Start Date End Date Taking? Authorizing Provider   ibuprofen (ADVIL;MOTRIN) 800 MG tablet Take 1 tablet by mouth every 6 hours as needed for Pain    ProviderCristina MD   ferrous sulfate (IRON 325) 325 (65 Fe) MG tablet Take 1 tablet by mouth daily (with breakfast) 24  Elsy Lucero DO   Prenatal MV-Min-Fe Fum-FA-DHA (PRENATAL 1 PO) Take 1 tablet by mouth daily gummy    ProviderCristina MD   sertraline (ZOLOFT) 50 MG tablet Take 2 tablets by mouth daily 10/12/22   ProviderCristina MD       Current medications:    No current facility-administered medications for this encounter.     Current Outpatient Medications   Medication Sig Dispense Refill   • ibuprofen (ADVIL;MOTRIN) 800 MG tablet Take 1 tablet by mouth every 6 hours as needed for Pain     • ferrous sulfate (IRON 325) 325 (65 Fe) MG tablet Take 1 tablet by mouth daily (with breakfast) 180 tablet 1   • Prenatal MV-Min-Fe Fum-FA-DHA (PRENATAL 1 PO) Take 1 tablet by mouth daily gummy     • sertraline (ZOLOFT) 50 MG tablet Take 2 tablets by mouth daily         Allergies:    Allergies   Allergen Reactions   • Bupropion Other (See Comments)   • Venlafaxine Nausea And Vomiting       Problem List:    Patient Active Problem List   Diagnosis Code   • Crushing injury of finger, left S67.10XA   • Family history of diabetes mellitus Z83.3   • Cervical polyp N84.1   • Endometriosis determined by laparoscopy N80.9   • Endometrial polyp N84.0   • Pre-Pregnancy BMI 35

## 2025-06-02 NOTE — ANESTHESIA POSTPROCEDURE EVALUATION
Department of Anesthesiology  Postprocedure Note    Patient: Keiko Araujo  MRN: [id number]  YOB: 1990  Date of evaluation: 2025    Procedure Summary       Date: 25 Room / Location: [address]    Anesthesia Start: 931 Anesthesia Stop:     Procedures:       DILATATION AND CURETTAGE HYSTEROSCOPY AVETA,      DIAGNOSTIC LAPAROSCOPY, FULGURATION OF IMPLANTS, POSSIBLE MYOMECTOMY (ARGON BEAM) Diagnosis:       Endometriosis      Fibroids      Cervical polyp      Spontaneous       (Endometriosis [N80.9])      (Fibroids [D21.9])      (Cervical polyp [N84.1])      (Spontaneous  [O03.9])    Surgeons: Geraldine Berger DO Responsible Provider: Benjamín Roa MD    Anesthesia Type: general ASA Status: 1            Anesthesia Type: No value filed.    Leela Phase I: Leela Score: 10    Leela Phase II: Leela Score: 10    Anesthesia Post Evaluation    Patient location during evaluation: bedside  Patient participation: complete - patient participated  Level of consciousness: awake  Airway patency: patent  Nausea & Vomiting: no nausea and no vomiting  Cardiovascular status: blood pressure returned to baseline  Respiratory status: acceptable  Hydration status: euvolemic  Comments: BP (!) 97/55   Pulse 75   Temp 96.8 °F (36 °C) (Temporal)   Resp 19   LMP 2025   SpO2 98%     Pain management: adequate    No notable events documented.

## 2025-06-03 ENCOUNTER — RESULTS FOLLOW-UP (OUTPATIENT)
Dept: OBGYN | Age: 35
End: 2025-06-03

## 2025-06-03 LAB
HCG, PREGNANCY URINE (POC): NEGATIVE
SURGICAL PATHOLOGY REPORT: NORMAL

## 2025-06-16 ENCOUNTER — OFFICE VISIT (OUTPATIENT)
Dept: OBGYN CLINIC | Age: 35
End: 2025-06-16

## 2025-06-16 VITALS
BODY MASS INDEX: 35.31 KG/M2 | WEIGHT: 238.4 LBS | DIASTOLIC BLOOD PRESSURE: 63 MMHG | HEART RATE: 80 BPM | HEIGHT: 69 IN | SYSTOLIC BLOOD PRESSURE: 108 MMHG

## 2025-06-16 DIAGNOSIS — D21.9 FIBROIDS: ICD-10-CM

## 2025-06-16 DIAGNOSIS — Z98.890 POST-OPERATIVE STATE: Primary | ICD-10-CM

## 2025-06-16 PROCEDURE — 99024 POSTOP FOLLOW-UP VISIT: CPT | Performed by: STUDENT IN AN ORGANIZED HEALTH CARE EDUCATION/TRAINING PROGRAM

## 2025-06-16 RX ORDER — FERROUS SULFATE 325(65) MG
325 TABLET ORAL
COMMUNITY

## 2025-07-17 PROBLEM — Z98.890 POST-OPERATIVE STATE: Status: RESOLVED | Noted: 2025-06-02 | Resolved: 2025-07-17

## 2025-08-19 ENCOUNTER — TELEPHONE (OUTPATIENT)
Dept: OBGYN CLINIC | Age: 35
End: 2025-08-19

## 2025-08-21 ENCOUNTER — APPOINTMENT (OUTPATIENT)
Dept: ULTRASOUND IMAGING | Age: 35
End: 2025-08-21
Payer: COMMERCIAL

## 2025-08-21 ENCOUNTER — APPOINTMENT (OUTPATIENT)
Dept: MRI IMAGING | Age: 35
End: 2025-08-21
Payer: COMMERCIAL

## 2025-08-21 ENCOUNTER — TELEPHONE (OUTPATIENT)
Dept: OBGYN CLINIC | Age: 35
End: 2025-08-21

## 2025-08-21 ENCOUNTER — HOSPITAL ENCOUNTER (OUTPATIENT)
Age: 35
Setting detail: OBSERVATION
Discharge: HOME | End: 2025-08-22
Attending: EMERGENCY MEDICINE | Admitting: STUDENT IN AN ORGANIZED HEALTH CARE EDUCATION/TRAINING PROGRAM
Payer: COMMERCIAL

## 2025-08-21 ENCOUNTER — HOSPITAL ENCOUNTER (OUTPATIENT)
Dept: LAB | Facility: CLINIC | Age: 35
Discharge: HOME | End: 2025-08-21
Payer: COMMERCIAL

## 2025-08-21 DIAGNOSIS — O46.90 VAGINAL BLEEDING IN PREGNANCY: Primary | ICD-10-CM

## 2025-08-21 DIAGNOSIS — O20.9 BLEEDING IN EARLY PREGNANCY: Primary | ICD-10-CM

## 2025-08-21 DIAGNOSIS — O20.9 BLEEDING IN EARLY PREGNANCY: ICD-10-CM

## 2025-08-21 PROBLEM — O36.80X0 PREGNANCY OF UNKNOWN ANATOMIC LOCATION: Status: ACTIVE | Noted: 2025-08-21

## 2025-08-21 LAB
ABO + RH BLD: NORMAL
ANION GAP SERPL CALCULATED.3IONS-SCNC: 13 MMOL/L (ref 9–16)
B-HCG SERPL EIA 3RD IS-ACNC: 1477 MIU/ML
BACTERIA URNS QL MICRO: ABNORMAL
BILIRUB UR QL STRIP: NEGATIVE
BUN SERPL-MCNC: 10 MG/DL (ref 6–20)
CALCIUM SERPL-MCNC: 9.2 MG/DL (ref 8.6–10.4)
CASTS #/AREA URNS LPF: ABNORMAL /LPF (ref 0–2)
CASTS #/AREA URNS LPF: ABNORMAL /LPF (ref 0–2)
CHLORIDE SERPL-SCNC: 105 MMOL/L (ref 98–107)
CLARITY UR: ABNORMAL
CO2 SERPL-SCNC: 22 MMOL/L (ref 20–31)
COLOR UR: YELLOW
CREAT SERPL-MCNC: 0.6 MG/DL (ref 0.6–0.9)
EPI CELLS #/AREA URNS HPF: ABNORMAL /HPF (ref 0–5)
ERYTHROCYTE [DISTWIDTH] IN BLOOD BY AUTOMATED COUNT: 13.8 % (ref 11.8–14.4)
GFR, ESTIMATED: >90 ML/MIN/1.73M2
GLUCOSE SERPL-MCNC: 96 MG/DL (ref 74–99)
GLUCOSE UR STRIP-MCNC: NEGATIVE MG/DL
HCT VFR BLD AUTO: 34.1 % (ref 36.3–47.1)
HGB BLD-MCNC: 10.8 G/DL (ref 11.9–15.1)
HGB UR QL STRIP.AUTO: ABNORMAL
KETONES UR STRIP-MCNC: NEGATIVE MG/DL
LEUKOCYTE ESTERASE UR QL STRIP: ABNORMAL
MCH RBC QN AUTO: 27.9 PG (ref 25.2–33.5)
MCHC RBC AUTO-ENTMCNC: 31.7 G/DL (ref 28.4–34.8)
MCV RBC AUTO: 88.1 FL (ref 82.6–102.9)
MUCOUS THREADS URNS QL MICRO: ABNORMAL
NITRITE UR QL STRIP: NEGATIVE
NRBC BLD-RTO: 0 PER 100 WBC
PH UR STRIP: 5.5 [PH] (ref 5–8)
PLATELET # BLD AUTO: 206 K/UL (ref 138–453)
PMV BLD AUTO: 10 FL (ref 8.1–13.5)
POTASSIUM SERPL-SCNC: 4.7 MMOL/L (ref 3.7–5.3)
PROT UR STRIP-MCNC: ABNORMAL MG/DL
RBC # BLD AUTO: 3.87 M/UL (ref 3.95–5.11)
RBC #/AREA URNS HPF: ABNORMAL /HPF (ref 0–2)
SODIUM SERPL-SCNC: 140 MMOL/L (ref 136–145)
SP GR UR STRIP: 1.02 (ref 1–1.03)
UROBILINOGEN UR STRIP-ACNC: NORMAL EU/DL (ref 0–1)
WBC #/AREA URNS HPF: ABNORMAL /HPF (ref 0–5)
WBC OTHER # BLD: 8.9 K/UL (ref 3.5–11.3)

## 2025-08-21 PROCEDURE — 85027 COMPLETE CBC AUTOMATED: CPT

## 2025-08-21 PROCEDURE — 84702 CHORIONIC GONADOTROPIN TEST: CPT

## 2025-08-21 PROCEDURE — 86900 BLOOD TYPING SEROLOGIC ABO: CPT

## 2025-08-21 PROCEDURE — 99285 EMERGENCY DEPT VISIT HI MDM: CPT

## 2025-08-21 PROCEDURE — 87086 URINE CULTURE/COLONY COUNT: CPT

## 2025-08-21 PROCEDURE — 76817 TRANSVAGINAL US OBSTETRIC: CPT

## 2025-08-21 PROCEDURE — 36415 COLL VENOUS BLD VENIPUNCTURE: CPT

## 2025-08-21 PROCEDURE — 81001 URINALYSIS AUTO W/SCOPE: CPT

## 2025-08-21 PROCEDURE — 80048 BASIC METABOLIC PNL TOTAL CA: CPT

## 2025-08-21 PROCEDURE — 72195 MRI PELVIS W/O DYE: CPT

## 2025-08-21 PROCEDURE — 86901 BLOOD TYPING SEROLOGIC RH(D): CPT

## 2025-08-21 RX ORDER — ONDANSETRON 4 MG/1
4 TABLET, ORALLY DISINTEGRATING ORAL EVERY 6 HOURS PRN
Status: DISCONTINUED | OUTPATIENT
Start: 2025-08-21 | End: 2025-08-22 | Stop reason: HOSPADM

## 2025-08-21 RX ORDER — SODIUM CHLORIDE 0.9 % (FLUSH) 0.9 %
5-40 SYRINGE (ML) INJECTION EVERY 12 HOURS SCHEDULED
Status: DISCONTINUED | OUTPATIENT
Start: 2025-08-22 | End: 2025-08-22 | Stop reason: HOSPADM

## 2025-08-21 RX ORDER — SODIUM CHLORIDE 0.9 % (FLUSH) 0.9 %
5-40 SYRINGE (ML) INJECTION PRN
Status: DISCONTINUED | OUTPATIENT
Start: 2025-08-21 | End: 2025-08-22 | Stop reason: HOSPADM

## 2025-08-21 RX ORDER — CYCLOBENZAPRINE HCL 10 MG
10 TABLET ORAL 3 TIMES DAILY PRN
Status: DISCONTINUED | OUTPATIENT
Start: 2025-08-21 | End: 2025-08-22 | Stop reason: HOSPADM

## 2025-08-21 RX ORDER — ONDANSETRON 2 MG/ML
4 INJECTION INTRAMUSCULAR; INTRAVENOUS EVERY 6 HOURS PRN
Status: DISCONTINUED | OUTPATIENT
Start: 2025-08-21 | End: 2025-08-22 | Stop reason: HOSPADM

## 2025-08-21 RX ORDER — BISACODYL 10 MG
10 SUPPOSITORY, RECTAL RECTAL DAILY PRN
Status: DISCONTINUED | OUTPATIENT
Start: 2025-08-21 | End: 2025-08-22 | Stop reason: HOSPADM

## 2025-08-21 RX ORDER — SODIUM CHLORIDE 9 MG/ML
INJECTION, SOLUTION INTRAVENOUS PRN
Status: DISCONTINUED | OUTPATIENT
Start: 2025-08-21 | End: 2025-08-22 | Stop reason: HOSPADM

## 2025-08-21 RX ORDER — SENNA AND DOCUSATE SODIUM 50; 8.6 MG/1; MG/1
2 TABLET, FILM COATED ORAL NIGHTLY
Status: DISCONTINUED | OUTPATIENT
Start: 2025-08-22 | End: 2025-08-22 | Stop reason: HOSPADM

## 2025-08-21 RX ORDER — SIMETHICONE 80 MG
80 TABLET,CHEWABLE ORAL EVERY 6 HOURS PRN
Status: DISCONTINUED | OUTPATIENT
Start: 2025-08-21 | End: 2025-08-22 | Stop reason: HOSPADM

## 2025-08-21 RX ORDER — POLYETHYLENE GLYCOL 3350 17 G/17G
17 POWDER, FOR SOLUTION ORAL DAILY PRN
Status: DISCONTINUED | OUTPATIENT
Start: 2025-08-21 | End: 2025-08-22 | Stop reason: HOSPADM

## 2025-08-21 RX ORDER — ACETAMINOPHEN 500 MG
1000 TABLET ORAL EVERY 6 HOURS PRN
Status: DISCONTINUED | OUTPATIENT
Start: 2025-08-21 | End: 2025-08-22 | Stop reason: HOSPADM

## 2025-08-21 ASSESSMENT — PAIN SCALES - GENERAL
PAINLEVEL_OUTOF10: 7
PAINLEVEL_OUTOF10: 0

## 2025-08-22 VITALS
SYSTOLIC BLOOD PRESSURE: 108 MMHG | HEIGHT: 69 IN | WEIGHT: 240 LBS | DIASTOLIC BLOOD PRESSURE: 71 MMHG | RESPIRATION RATE: 18 BRPM | OXYGEN SATURATION: 100 % | HEART RATE: 76 BPM | TEMPERATURE: 98.2 F | BODY MASS INDEX: 35.55 KG/M2

## 2025-08-22 LAB
B-HCG SERPL EIA 3RD IS-ACNC: 720 MIU/ML
BASOPHILS # BLD: 0.03 K/UL (ref 0–0.2)
BASOPHILS NFR BLD: 0 % (ref 0–2)
EOSINOPHIL # BLD: 0.1 K/UL (ref 0–0.44)
EOSINOPHILS RELATIVE PERCENT: 1 % (ref 1–4)
ERYTHROCYTE [DISTWIDTH] IN BLOOD BY AUTOMATED COUNT: 13.8 % (ref 11.8–14.4)
HCT VFR BLD AUTO: 32.9 % (ref 36.3–47.1)
HGB BLD-MCNC: 10.4 G/DL (ref 11.9–15.1)
IMM GRANULOCYTES # BLD AUTO: <0.03 K/UL (ref 0–0.3)
IMM GRANULOCYTES NFR BLD: 0 %
LYMPHOCYTES NFR BLD: 1.6 K/UL (ref 1.1–3.7)
LYMPHOCYTES RELATIVE PERCENT: 23 % (ref 24–43)
MCH RBC QN AUTO: 28.3 PG (ref 25.2–33.5)
MCHC RBC AUTO-ENTMCNC: 31.6 G/DL (ref 28.4–34.8)
MCV RBC AUTO: 89.4 FL (ref 82.6–102.9)
MICROORGANISM SPEC CULT: NORMAL
MONOCYTES NFR BLD: 0.62 K/UL (ref 0.1–1.2)
MONOCYTES NFR BLD: 9 % (ref 3–12)
NEUTROPHILS NFR BLD: 67 % (ref 36–65)
NEUTS SEG NFR BLD: 4.64 K/UL (ref 1.5–8.1)
NRBC BLD-RTO: 0 PER 100 WBC
PLATELET # BLD AUTO: 187 K/UL (ref 138–453)
PMV BLD AUTO: 9.6 FL (ref 8.1–13.5)
RBC # BLD AUTO: 3.68 M/UL (ref 3.95–5.11)
SPECIMEN DESCRIPTION: NORMAL
WBC OTHER # BLD: 7 K/UL (ref 3.5–11.3)

## 2025-08-22 PROCEDURE — 85025 COMPLETE CBC W/AUTO DIFF WBC: CPT

## 2025-08-22 PROCEDURE — 36415 COLL VENOUS BLD VENIPUNCTURE: CPT

## 2025-08-22 PROCEDURE — 84702 CHORIONIC GONADOTROPIN TEST: CPT

## 2025-08-22 PROCEDURE — G0378 HOSPITAL OBSERVATION PER HR: HCPCS

## 2025-08-22 RX ORDER — ACETAMINOPHEN 500 MG
1000 TABLET ORAL EVERY 6 HOURS PRN
Qty: 60 TABLET | Refills: 0 | Status: SHIPPED | OUTPATIENT
Start: 2025-08-22 | End: 2025-09-21

## 2025-08-22 RX ORDER — IBUPROFEN 600 MG/1
600 TABLET, FILM COATED ORAL EVERY 6 HOURS PRN
Qty: 120 TABLET | Refills: 0 | Status: SHIPPED | OUTPATIENT
Start: 2025-08-22 | End: 2025-09-21

## 2025-08-22 RX ORDER — ONDANSETRON 4 MG/1
4 TABLET, ORALLY DISINTEGRATING ORAL 3 TIMES DAILY PRN
Qty: 21 TABLET | Refills: 0 | Status: SHIPPED | OUTPATIENT
Start: 2025-08-22

## 2025-08-22 ASSESSMENT — PAIN SCALES - GENERAL
PAINLEVEL_OUTOF10: 0

## 2025-08-28 ENCOUNTER — HOSPITAL ENCOUNTER (OUTPATIENT)
Age: 35
Discharge: HOME | End: 2025-08-28

## 2025-08-28 DIAGNOSIS — O46.90 VAGINAL BLEEDING IN PREGNANCY: ICD-10-CM

## 2025-08-28 LAB — B-HCG SERPL EIA 3RD IS-ACNC: 22.5 MIU/ML

## 2025-09-03 ENCOUNTER — ROUTINE PRENATAL (OUTPATIENT)
Dept: OBGYN CLINIC | Age: 35
End: 2025-09-03
Payer: COMMERCIAL

## 2025-09-03 VITALS
BODY MASS INDEX: 36 KG/M2 | WEIGHT: 243.8 LBS | DIASTOLIC BLOOD PRESSURE: 65 MMHG | SYSTOLIC BLOOD PRESSURE: 117 MMHG | HEART RATE: 82 BPM

## 2025-09-03 DIAGNOSIS — O03.9 SAB (SPONTANEOUS ABORTION): Primary | ICD-10-CM

## 2025-09-03 PROCEDURE — G8417 CALC BMI ABV UP PARAM F/U: HCPCS | Performed by: NURSE PRACTITIONER

## 2025-09-03 PROCEDURE — G8427 DOCREV CUR MEDS BY ELIG CLIN: HCPCS | Performed by: NURSE PRACTITIONER

## 2025-09-03 PROCEDURE — 1036F TOBACCO NON-USER: CPT | Performed by: NURSE PRACTITIONER

## 2025-09-03 PROCEDURE — 99213 OFFICE O/P EST LOW 20 MIN: CPT | Performed by: NURSE PRACTITIONER

## 2025-09-03 ASSESSMENT — ENCOUNTER SYMPTOMS
ABDOMINAL DISTENTION: 0
COUGH: 0
BACK PAIN: 0
CONSTIPATION: 0
SHORTNESS OF BREATH: 0
ABDOMINAL PAIN: 0
DIARRHEA: 0

## (undated) DEVICE — DRAPE,UNDRBUT,WHT GRAD PCH,CAPPORT,20/CS: Brand: MEDLINE

## (undated) DEVICE — 1LYRTR 16FR10ML100%SIL UMS SNP: Brand: MEDLINE INDUSTRIES, INC.

## (undated) DEVICE — TUBING, SUCTION, 9/32" X 20', STRAIGHT: Brand: MEDLINE INDUSTRIES, INC.

## (undated) DEVICE — Device

## (undated) DEVICE — SYSTEM WST MGMT AVETA

## (undated) DEVICE — HYSTEROSCOPE RIGID CORAL AVETA DISP

## (undated) DEVICE — SYSTEM ENDOSCP FLUID MGMT CAP AVETA

## (undated) DEVICE — STRAP,POSITIONING,KNEE/BODY,FOAM,4X60": Brand: MEDLINE

## (undated) DEVICE — COVER XR CASS W24XL25IN CLR POLY FLM DRAPEABLE W REL LNR

## (undated) DEVICE — INSUFFLATION NEEDLE TO ESTABLISH PNEUMOPERITONEUM.: Brand: INSUFFLATION NEEDLE

## (undated) DEVICE — COVER OR TBL W40XL90IN ABSRB STD AND GRIPPY BK SAHARA

## (undated) DEVICE — LAPAROSCOPIC SCISSORS: Brand: EPIX LAPAROSCOPIC SCISSORS

## (undated) DEVICE — SOLUTION SCRB 4OZ 2% CHG FOR SURG SCRBBING HND WSH

## (undated) DEVICE — DRESSING TRNSPAR W2XL2.75IN FLM SHT SEMIPERMEABLE WIND

## (undated) DEVICE — STRAP ARMBRD W1.5XL32IN FOAM STR YET SFT W/ HK AND LOOP

## (undated) DEVICE — GARMENT,MEDLINE,DVT,INT,CALF,MED, GEN2: Brand: MEDLINE

## (undated) DEVICE — PREMIUM DRY TRAY LF: Brand: MEDLINE INDUSTRIES, INC.

## (undated) DEVICE — GLOVE SURG SZ 6 THK91MIL LTX FREE SYN POLYISOPRENE ANTI

## (undated) DEVICE — SYRINGE MED 10ML LUERLOCK TIP W/O SFTY DISP

## (undated) DEVICE — TROCAR: Brand: KII SLEEVE

## (undated) DEVICE — 1016 S-DRAPE IRRIG POUCH 10/BOX: Brand: STERI-DRAPE™

## (undated) DEVICE — PAD,NON-ADHERENT,3X8,STERILE,LF,1/PK: Brand: MEDLINE

## (undated) DEVICE — TROCAR: Brand: KII FIOS FIRST ENTRY

## (undated) DEVICE — LEGGINGS, PAIR, 31X48, STERILE: Brand: MEDLINE

## (undated) DEVICE — SYSTEM POS SZ 36 X 20 X 1 IN INTEGR ADJ ARM PROTECTOR LIFT

## (undated) DEVICE — SPONGE GZ W3XL3IN 4 PLY RAYON POLY STD NONWOVEN

## (undated) DEVICE — SOLIDIFIER FLD 3.2OZ 3000CC TRAD IN BTL LIQUI-LOC